# Patient Record
Sex: MALE | Race: WHITE | Employment: UNEMPLOYED | URBAN - METROPOLITAN AREA
[De-identification: names, ages, dates, MRNs, and addresses within clinical notes are randomized per-mention and may not be internally consistent; named-entity substitution may affect disease eponyms.]

---

## 2017-07-20 ENCOUNTER — HOSPITAL ENCOUNTER (OUTPATIENT)
Dept: GENERAL RADIOLOGY | Age: 62
Discharge: HOME | End: 2017-07-20
Attending: FAMILY MEDICINE | Admitting: FAMILY MEDICINE

## 2017-07-20 DIAGNOSIS — M79.641 RIGHT HAND PAIN: ICD-10-CM

## 2019-02-07 ENCOUNTER — HOSPITAL ENCOUNTER (OUTPATIENT)
Dept: GENERAL RADIOLOGY | Age: 64
Discharge: HOME | End: 2019-02-07
Payer: COMMERCIAL

## 2019-02-07 ENCOUNTER — HOSPITAL ENCOUNTER (OUTPATIENT)
Age: 64
Discharge: HOME | End: 2019-02-07
Payer: COMMERCIAL

## 2019-02-07 DIAGNOSIS — J20.9 ACUTE BRONCHITIS, UNSPECIFIED ORGANISM: ICD-10-CM

## 2019-02-07 PROCEDURE — 71046 X-RAY EXAM CHEST 2 VIEWS: CPT

## 2019-09-29 RX ORDER — OLMESARTAN MEDOXOMIL 20 MG/1
40 TABLET ORAL DAILY
COMMUNITY

## 2019-09-29 RX ORDER — PANTOPRAZOLE SODIUM 40 MG/1
40 TABLET, DELAYED RELEASE ORAL DAILY
COMMUNITY
End: 2020-11-30

## 2020-01-09 ENCOUNTER — OFFICE VISIT (OUTPATIENT)
Dept: FAMILY MEDICINE CLINIC | Age: 65
End: 2020-01-09
Payer: COMMERCIAL

## 2020-01-09 ENCOUNTER — TELEPHONE (OUTPATIENT)
Dept: FAMILY MEDICINE CLINIC | Age: 65
End: 2020-01-09

## 2020-01-09 VITALS
HEIGHT: 69 IN | TEMPERATURE: 98.1 F | DIASTOLIC BLOOD PRESSURE: 80 MMHG | OXYGEN SATURATION: 98 % | SYSTOLIC BLOOD PRESSURE: 132 MMHG | BODY MASS INDEX: 32.61 KG/M2 | HEART RATE: 77 BPM | WEIGHT: 220.2 LBS

## 2020-01-09 DIAGNOSIS — J40 BRONCHITIS: ICD-10-CM

## 2020-01-09 LAB
A/G RATIO: 1.5 (ref 1.1–2.2)
ALBUMIN SERPL-MCNC: 4 G/DL (ref 3.4–5)
ALP BLD-CCNC: 62 U/L (ref 40–129)
ALT SERPL-CCNC: 15 U/L (ref 10–40)
ANION GAP SERPL CALCULATED.3IONS-SCNC: 15 MMOL/L (ref 3–16)
AST SERPL-CCNC: 16 U/L (ref 15–37)
BASOPHILS ABSOLUTE: 0.1 K/UL (ref 0–0.2)
BASOPHILS RELATIVE PERCENT: 0.6 %
BILIRUB SERPL-MCNC: 0.4 MG/DL (ref 0–1)
BUN BLDV-MCNC: 10 MG/DL (ref 7–20)
CALCIUM SERPL-MCNC: 9.2 MG/DL (ref 8.3–10.6)
CHLORIDE BLD-SCNC: 101 MMOL/L (ref 99–110)
CO2: 24 MMOL/L (ref 21–32)
CREAT SERPL-MCNC: 1.5 MG/DL (ref 0.8–1.3)
EOSINOPHILS ABSOLUTE: 0.3 K/UL (ref 0–0.6)
EOSINOPHILS RELATIVE PERCENT: 3.5 %
GFR AFRICAN AMERICAN: 57
GFR NON-AFRICAN AMERICAN: 47
GLOBULIN: 2.7 G/DL
GLUCOSE BLD-MCNC: 111 MG/DL (ref 70–99)
HCT VFR BLD CALC: 45.7 % (ref 40.5–52.5)
HEMOGLOBIN: 15.3 G/DL (ref 13.5–17.5)
LYMPHOCYTES ABSOLUTE: 2 K/UL (ref 1–5.1)
LYMPHOCYTES RELATIVE PERCENT: 21.2 %
MCH RBC QN AUTO: 31 PG (ref 26–34)
MCHC RBC AUTO-ENTMCNC: 33.5 G/DL (ref 31–36)
MCV RBC AUTO: 92.6 FL (ref 80–100)
MONOCYTES ABSOLUTE: 1 K/UL (ref 0–1.3)
MONOCYTES RELATIVE PERCENT: 11 %
NEUTROPHILS ABSOLUTE: 5.9 K/UL (ref 1.7–7.7)
NEUTROPHILS RELATIVE PERCENT: 63.7 %
PDW BLD-RTO: 13.8 % (ref 12.4–15.4)
PLATELET # BLD: 264 K/UL (ref 135–450)
PMV BLD AUTO: 8.5 FL (ref 5–10.5)
POTASSIUM SERPL-SCNC: 4.4 MMOL/L (ref 3.5–5.1)
RBC # BLD: 4.93 M/UL (ref 4.2–5.9)
SODIUM BLD-SCNC: 140 MMOL/L (ref 136–145)
TOTAL PROTEIN: 6.7 G/DL (ref 6.4–8.2)
WBC # BLD: 9.3 K/UL (ref 4–11)

## 2020-01-09 PROCEDURE — 99214 OFFICE O/P EST MOD 30 MIN: CPT | Performed by: FAMILY MEDICINE

## 2020-01-09 RX ORDER — BENZONATATE 100 MG/1
100 CAPSULE ORAL 3 TIMES DAILY PRN
Qty: 30 CAPSULE | Refills: 0 | Status: SHIPPED | OUTPATIENT
Start: 2020-01-09 | End: 2020-01-16

## 2020-01-09 RX ORDER — GUAIFENESIN 600 MG/1
600 TABLET, EXTENDED RELEASE ORAL 2 TIMES DAILY
Qty: 30 TABLET | Refills: 0 | Status: SHIPPED | OUTPATIENT
Start: 2020-01-09 | End: 2020-01-09

## 2020-01-09 RX ORDER — AMOXICILLIN AND CLAVULANATE POTASSIUM 875; 125 MG/1; MG/1
1 TABLET, FILM COATED ORAL 2 TIMES DAILY
Qty: 20 TABLET | Refills: 0 | Status: SHIPPED | OUTPATIENT
Start: 2020-01-09 | End: 2020-01-19

## 2020-01-09 RX ORDER — METHYLPREDNISOLONE 4 MG/1
TABLET ORAL
Qty: 1 KIT | Refills: 0 | Status: SHIPPED | OUTPATIENT
Start: 2020-01-09 | End: 2020-01-15

## 2020-01-09 RX ORDER — METHYLPREDNISOLONE 4 MG/1
TABLET ORAL
Qty: 1 KIT | Refills: 0 | Status: SHIPPED | OUTPATIENT
Start: 2020-01-09 | End: 2020-01-09

## 2020-01-09 RX ORDER — AMOXICILLIN AND CLAVULANATE POTASSIUM 875; 125 MG/1; MG/1
1 TABLET, FILM COATED ORAL 2 TIMES DAILY
Qty: 20 TABLET | Refills: 0 | Status: SHIPPED | OUTPATIENT
Start: 2020-01-09 | End: 2020-01-09

## 2020-01-09 RX ORDER — BENZONATATE 100 MG/1
100 CAPSULE ORAL 3 TIMES DAILY PRN
Qty: 30 CAPSULE | Refills: 0 | Status: SHIPPED | OUTPATIENT
Start: 2020-01-09 | End: 2020-01-09

## 2020-01-09 RX ORDER — GUAIFENESIN 600 MG/1
600 TABLET, EXTENDED RELEASE ORAL 2 TIMES DAILY
Qty: 30 TABLET | Refills: 0 | Status: SHIPPED | OUTPATIENT
Start: 2020-01-09 | End: 2020-01-24

## 2020-01-09 ASSESSMENT — PATIENT HEALTH QUESTIONNAIRE - PHQ9
SUM OF ALL RESPONSES TO PHQ9 QUESTIONS 1 & 2: 0
SUM OF ALL RESPONSES TO PHQ QUESTIONS 1-9: 0
2. FEELING DOWN, DEPRESSED OR HOPELESS: 0
1. LITTLE INTEREST OR PLEASURE IN DOING THINGS: 0
SUM OF ALL RESPONSES TO PHQ QUESTIONS 1-9: 0

## 2020-01-09 ASSESSMENT — ENCOUNTER SYMPTOMS
SORE THROAT: 1
WHEEZING: 1
NAUSEA: 0
ABDOMINAL PAIN: 0
VOMITING: 0
COUGH: 1
SINUS PRESSURE: 1
DIARRHEA: 0
SINUS PAIN: 1
SHORTNESS OF BREATH: 0
RHINORRHEA: 1
BACK PAIN: 1

## 2020-01-09 NOTE — PATIENT INSTRUCTIONS
Patient Education        Patient Education        guaifenesin  Pronunciation:  gwye FEN e sin  Brand: Altarussin, Bidex-400, Fenesin IR, Corie-Tussin Expectorant, Mucinex, Mucus Relief, Robafen, Scot-Tussin, Siltussin SA, Tussin Expectorant, Xpect  What is the most important information I should know about guaifenesin? Ask a doctor or pharmacist before using this medicine if you have health problems or use other medications, or if you are pregnant or breast-feeding. What is guaifenesin? Guaifenesin is used to reduce chest congestion caused by the common cold, flu, or chronic bronchitis. Guaifenesin helps loosen congestion in your chest and throat, making it easier to cough out through your mouth. There are many brands and forms of guaifenesin available. Not all brands are listed on this leaflet. Guaifenesin may also be used for purposes not listed in this medication guide. What should I discuss with my healthcare provider before taking guaifenesin? You should not use guaifenesin if you are allergic to it. Ask a doctor or pharmacist if it is safe for you to use this medicine if you have other medical conditions. Ask a doctor before using this medicine if you are pregnant. You should not breast-feed while using guaifenesin. How should I take guaifenesin? Use exactly as directed on the label, or as prescribed by your doctor. Cold or cough medicine is only for short-term use until your symptoms clear up. Always follow directions on the medicine label about giving cough or cold medicine to a child. Do not use the medicine only to make a child sleepy. Death can occur from the misuse of cough or cold medicines in very young children. Measure liquid medicine carefully. Use the dosing syringe provided, or use a medicine dose-measuring device (not a kitchen spoon). To use guaifenesin granules, pour out the entire packet onto your tongue and swallow without chewing.   Call your doctor if your symptoms do not improve after 7 days, or if you have a fever, rash, or headaches. This medicine can affect the results of certain medical tests. Tell any doctor who treats you that you are using guaifenesin. Store at room temperature away from moisture and heat. Do not freeze. What happens if I miss a dose? Since cough or cold medicine is used when needed, you may not be on a dosing schedule. Skip any missed dose if it's almost time for your next dose. Do not use two doses at one time. What happens if I overdose? Seek emergency medical attention or call the Poison Help line at 1-247.849.2739. What should I avoid while taking guaifenesin? Ask a doctor or pharmacist before using other cough or cold medicines that may contain similar ingredients. Avoid driving or hazardous activity until you know how this medicine will affect you. Your reactions could be impaired. What are the possible side effects of guaifenesin? Get emergency medical help if you have signs of an allergic reaction: hives; difficult breathing; swelling of your face, lips, tongue, or throat. Common side effects may include:  · nausea; or  · vomiting. This is not a complete list of side effects and others may occur. Call your doctor for medical advice about side effects. You may report side effects to FDA at 0-920-YET-2497. What other drugs will affect guaifenesin ? Avoid using this medicine with other drugs that cause drowsiness or slow your breathing (such as opioid medicine, a muscle relaxer, or medicine for anxiety or seizures). Ask a doctor or pharmacist before using any other medication, including prescription and over-the-counter medicines, vitamins, and herbal products. Not all possible drug interactions are listed in this medication guide. Where can I get more information? Your pharmacist can provide more information about guaifenesin.   Remember, keep this and all other medicines out of the reach of children, never share your medicines with (oral)  Pronunciation:  METH il pred NIS oh lone  Brand:  Medrol, Medrol Dosepak, MethylPREDNISolone Dose Pack  What is the most important information I should know about methylprednisolone? You should not use this medicine if you have a fungal infection anywhere in your body. What is methylprednisolone? Methylprednisolone is a steroid that prevents the release of substances in the body that cause inflammation. Methylprednisolone is used to treat many different inflammatory conditions such as arthritis, lupus, psoriasis, ulcerative colitis, allergic disorders, gland (endocrine) disorders, and conditions that affect the skin, eyes, lungs, stomach, nervous system, or blood cells. Methylprednisolone may also be used for purposes not listed in this medication guide. What should I discuss with my healthcare provider before taking methylprednisolone? You should not use methylprednisolone if you are allergic to it, or if you have:  · a fungal infection anywhere in your body. Methylprednisolone can weaken your immune system, making it easier for you to get an infection. Steroids can also worsen an infection you already have, or reactivate an infection you recently had. Tell your doctor about any illness or infection you have had within the past several weeks. To make sure methylprednisolone is safe for you, tell your doctor if you have ever had:  · a thyroid disorder;  · herpes infection of the eyes;  · stomach ulcers, ulcerative colitis, or diverticulitis;  · depression, mental illness, or psychosis;  · liver disease (especially cirrhosis);  · high blood pressure;  · osteoporosis;  · a muscle disorder such as myasthenia gravis; or  · multiple sclerosis. Also tell your doctor if you have diabetes. Steroid medicines may increase the glucose (sugar) levels in your blood or urine. You may also need to adjust the dose of your diabetes medications. It is not known whether this medicine will harm an unborn baby.  Tell your doctor if you are pregnant or plan to become pregnant. It is not known whether methylprednisolone passes into breast milk or if it could affect the nursing baby. Tell your doctor if you are breast-feeding. How should I take methylprednisolone? Follow all directions on your prescription label. Your doctor may occasionally change your dose. Do not use this medicine in larger or smaller amounts or for longer than recommended. Methylprednisolone is sometimes taken every other day. Follow your doctor's dosing instructions very carefully. Your dose needs may change if you have unusual stress such as a serious illness, fever or infection, or if you have surgery or a medical emergency. Tell your doctor about any such situation that affects you. This medicine can cause unusual results with certain medical tests. Tell any doctor who treats you that you are using methylprednisolone. You should not stop using methylprednisolone suddenly. Follow your doctor's instructions about tapering your dose. Wear a medical alert tag or carry an ID card stating that you take methylprednisolone. Any medical care provider who treats you should know that you take steroid medication. If you need surgery, tell the surgeon ahead of time that you are using methylprednisolone. You may need to stop using the medicine for a short time. Store at room temperature away from moisture and heat. What happens if I miss a dose? Call your doctor for instructions if you miss a dose of methylprednisolone. What happens if I overdose? Seek emergency medical attention or call the Poison Help line at 1-510.310.6261. An overdose of methylprednisolone is not expected to produce life threatening symptoms.  However, long term use of high steroid doses can lead to symptoms such as thinning skin, easy bruising, changes in the shape or location of body fat (especially in your face, neck, back, and waist), increased acne or facial hair, menstrual problems, impotence, or loss of interest in sex. What should I avoid while taking methylprednisolone? Avoid being near people who are sick or have infections. Call your doctor for preventive treatment if you are exposed to chicken pox or measles. These conditions can be serious or even fatal in people who are using steroid medication. Do not receive a \"live\" vaccine while using methylprednisolone. The vaccine may not work as well during this time, and may not fully protect you from disease. Live vaccines include measles, mumps, rubella (MMR), polio, rotavirus, typhoid, yellow fever, varicella (chickenpox), zoster (shingles), and nasal flu (influenza) vaccine. What are the possible side effects of methylprednisolone? Get emergency medical help if you have signs of an allergic reaction: hives; difficult breathing; swelling of your face, lips, tongue, or throat. Call your doctor at once if you have:  · shortness of breath (even with mild exertion), swelling, rapid weight gain;  · bruising, thinning skin, or any wound that will not heal;  · blurred vision, tunnel vision, eye pain, or seeing halos around lights;  · severe depression, changes in personality, unusual thoughts or behavior;  · new or unusual pain in an arm or leg or in your back;  · bloody or tarry stools, coughing up blood or vomit that looks like coffee grounds;  · seizure (convulsions); or  · low potassium --leg cramps, constipation, irregular heartbeats, fluttering in your chest, increased thirst or urination, numbness or tingling. Steroids can affect growth in children. Tell your doctor if your child is not growing at a normal rate while using this medicine. Common side effects may include:  · fluid retention (swelling in your hands or ankles);  · dizziness, spinning sensation;  · changes in your menstrual periods;  · headache;  · mild muscle pain or weakness; or  · stomach discomfort, bloating.   This is not a complete list of side effects and others may occur. Call your doctor for medical advice about side effects. You may report side effects to FDA at 3-412-FDA-1296. What other drugs will affect methylprednisolone? Other drugs may interact with methylprednisolone, including prescription and over-the-counter medicines, vitamins, and herbal products. Tell each of your health care providers about all medicines you use now and any medicine you start or stop using. Where can I get more information? Your pharmacist can provide more information about methylprednisolone. Remember, keep this and all other medicines out of the reach of children, never share your medicines with others, and use this medication only for the indication prescribed. Every effort has been made to ensure that the information provided by [address] is accurate, up-to-date, and complete, but no guarantee is made to that effect. Drug information contained herein may be time sensitive. [email or web address] information has been compiled for use by healthcare practitioners and consumers in the [place] and therefore [employer] does not warrant that uses outside of the [place] are appropriate, unless specifically indicated otherwise. [facility]'s drug information does not endorse drugs, diagnose patients or recommend therapy. [employer] drug information is an informational resource designed to assist licensed healthcare practitioners in caring for their patients and/or to serve consumers viewing this service as a supplement to, and not a substitute for, the expertise, skill, knowledge and judgment of healthcare practitioners. The absence of a warning for a given drug or drug combination in no way should be construed to indicate that the drug or drug combination is safe, effective or appropriate for any given patient. [facility] does not assume any responsibility for any aspect of healthcare administered with the aid of information [facility] provides.  The information contained herein is not intended to cover all possible uses, directions, precautions, warnings, drug interactions, allergic reactions, or adverse effects. If you have questions about the drugs you are taking, check with your doctor, nurse or pharmacist.  Copyright 5057-4331 [address]. Version: 9.01. Revision date: 8/30/2017. Care instructions adapted under license by [facility] ([facility]). If you have questions about a medical condition or this instruction, always ask your healthcare professional. [place] [address] any warranty or liability for your use of this information. Patient Education        amoxicillin and clavulanate potassium  Pronunciation:  am OK i REJI in [address] ate lisette TAS ee um  Brand:  Augmentin, Augmentin ES-600, Augmentin XR  What is the most important information I should know about amoxicillin and clavulanate potassium? You should not use this medicine if you have severe kidney disease, if you have had liver problems or jaundice while taking amoxicillin and clavulanate potassium, or if you are allergic to any penicillin or cephalosporin antibiotic, such as Amoxil, Ceftin, Cefzil, Moxatag, Omnicef, and others. What is amoxicillin and clavulanate potassium? Amoxicillin is a penicillin antibiotic that fights bacteria in the body. Clavulanate potassium is a beta-lactamase inhibitor that helps prevent certain bacteria from becoming resistant to amoxicillin. Amoxicillin and clavulanate potassium is a combination medicine used to treat many different infections caused by bacteria, such as sinusitis, pneumonia, ear infections, bronchitis, urinary tract infections, and infections of the skin. Amoxicillin and clavulanate potassium may also be used for purposes not listed in this medication guide. What should I discuss with my healthcare provider before taking amoxicillin and clavulanate potassium?   You should not use this medicine if you are allergic to it, or if:  · you have severe kidney disease (or if you are on dialysis);  · you have had liver problems or jaundice while taking amoxicillin and clavulanate potassium; or  · you are allergic to any penicillin or cephalosporin antibiotic, such as Amoxil, Ceftin, Cefzil, Moxatag, Omnicef, and others. To make sure amoxicillin and clavulanate potassium is safe for you, tell your doctor if you have ever had:  · liver disease (hepatitis or jaundice);  · kidney disease; or  · mononucleosis. It is not known whether this medicine will harm an unborn baby. Tell your doctor if you are pregnant or plan to become pregnant. Amoxicillin and clavulanate potassium can make birth control pills less effective. Ask your doctor about using a non-hormonal birth control (condom, diaphragm with spermicide) to prevent pregnancy. Amoxicillin and clavulanate potassium can pass into breast milk and may affect the nursing baby. Tell your doctor if you are breast-feeding. Do not give this medicine to a child without medical advice. The liquid or chewable tablet may contain phenylalanine. Talk to your doctor before using these forms of this medicine if you have phenylketonuria (PKU). How should I take amoxicillin and clavulanate potassium? Follow all directions on your prescription label. Do not take this medicine in larger or smaller amounts or for longer than recommended. Take the medicine every 12 hours, at the start of a meal to reduce stomach upset. Do not crush or chew the extended-release tablet. Swallow the pill whole, or break the pill in half and take both halves one at a time. If you have trouble swallowing a whole or half pill, talk with your doctor about using another form of amoxicillin and clavulanate potassium. The chewable tablet must be chewed before you swallow it. Shake the liquid medicine well just before you measure a dose. Measure liquid medicine with the dosing syringe provided, or with a special dose-measuring spoon or medicine cup.  If you do not have a dose-measuring device, ask your pharmacist for one. Use this medicine for the full prescribed length of time. Your symptoms may improve before the infection is completely cleared. Skipping doses may also increase your risk of further infection that is resistant to antibiotics. Amoxicillin and clavulanate potassium will not treat a viral infection such as the flu or a common cold. This medicine can cause unusual results with certain lab tests for glucose (sugar) in the urine. Tell any doctor who treats you that you are using amoxicillin and clavulanate potassium. Store the tablets at room temperature away from moisture and heat. Store the liquid  in the refrigerator. Throw away any unused liquid after 10 days. What happens if I miss a dose? Take the missed dose as soon as you remember. Skip the missed dose if it is almost time for your next scheduled dose. Do not take extra medicine to make up the missed dose. What happens if I overdose? Seek emergency medical attention or call the Poison Help line at 1-428.820.5217. Overdose can cause nausea, vomiting, stomach pain, diarrhea, skin rash, drowsiness, hyperactivity, and decreased urination. What should I avoid while taking amoxicillin and clavulanate potassium? Avoid taking this medicine together with or just after eating a high-fat meal. This will make it harder for your body to absorb the medication. Antibiotic medicines can cause diarrhea, which may be a sign of a new infection. If you have diarrhea that is watery or bloody, call your doctor. Do not use anti-diarrhea medicine unless your doctor tells you to. What are the possible side effects of amoxicillin and clavulanate potassium? Get emergency medical help if you have signs of an allergic reaction: hives; difficult breathing; swelling of your face, lips, tongue, or throat.   Call your doctor at once if you have:  · severe stomach pain, diarrhea that is watery or bloody;  · pale or appropriate, unless specifically indicated otherwise. [facility] drug information does not endorse drugs, diagnose patients or recommend therapy. [facility]'s drug information is an informational resource designed to assist licensed healthcare practitioners in caring for their patients and/or to serve consumers viewing this service as a supplement to, and not a substitute for, the expertise, skill, knowledge and judgment of healthcare practitioners. The absence of a warning for a given drug or drug combination in no way should be construed to indicate that the drug or drug combination is safe, effective or appropriate for any given patient. [facility] does not assume any responsibility for any aspect of healthcare administered with the aid of information [facility] provides. The information contained herein is not intended to cover all possible uses, directions, precautions, warnings, drug interactions, allergic reactions, or adverse effects. If you have questions about the drugs you are taking, check with your doctor, nurse or pharmacist.  Copyright 5615-5914 [address]. Version: 11.02. Revision date: 1/2/2018. Care instructions adapted under license by [facility] ([facility]). If you have questions about a medical condition or this instruction, always ask your healthcare professional. [place] [address] any warranty or liability for your use of this information. Patient Education        benzonatate  Pronunciation:  jalyn baires  Brand:  Tessalon, Tessalon Perles, Zonatuss  What is the most important information I should know about benzonatate? You should not use this medication if you are allergic to benzonatate or topical numbing medicines such as tetracaine or procaine (found in some insect bite and sunburn creams). Never suck or chew on a benzonatate capsule. Swallow the pill whole.  Sucking or chewing the capsule may cause your mouth and throat to feel numb or cause other serious side effects. Serious side effects of benzonatate include choking feeling, chest pain or numbness, feeling like you might pass out, confusion, or hallucinations. Some of these side effects may result from chewing or sucking on a benzonatate capsule. Do not give this medication to a child younger than 8years old without medical advice. An overdose of benzonatate can be fatal to a child. What is benzonatate? Benzonatate is a non-narcotic cough medicine. It works by numbing the throat and lungs, making the cough reflex less active. Benzonatate is used to relieve coughing. Benzonatate may also be used for purposes not listed in this medication guide. What should I discuss with my healthcare provider before taking benzonatate? You should not use this medication if you are allergic to benzonatate or topical numbing medicines such as tetracaine or procaine (found in some insect bite and sunburn creams). FDA pregnancy category C. It is not known whether benzonatate will harm an unborn baby. Tell your doctor if you are pregnant or plan to become pregnant while using this medication. It is not known whether benzonatate passes into breast milk or if it could harm a nursing baby. Do not use this medication without telling your doctor if you are breast-feeding a baby. Do not give this medication to a child younger than 8years old without medical advice. An overdose of benzonatate can be fatal, especially to a child. How should I take benzonatate? Take exactly as prescribed by your doctor. Do not take in larger or smaller amounts or for longer than recommended. Follow the directions on your prescription label. Always ask a doctor before giving a cough medicine to a child. Death can occur from the misuse of cough and cold medicines in very young children. Take each dose with a full glass of water. Never suck or chew on a benzonatate capsule. Swallow the pill whole.  Sucking or chewing the capsule may cause your mouth and throat to feel numb or cause other serious side effects. Store at room temperature away from moisture, heat, and light. What happens if I miss a dose? Take the missed dose as soon as you remember. Skip the missed dose if it is almost time for your next scheduled dose. Do not  take extra medicine to make up the missed dose. What happens if I overdose? Seek emergency medical attention or call the Poison Help line at 1-293.507.4356. An overdose of benzonatate can be fatal, especially to a child. Accidental death has occurred in children under 3years old who took only 1 or 2 capsules. Overdose symptoms may include numbness in the mouth or throat, feeling restless or very sleepy, tremors or shaking, seizure (convulsions), slow heart rate, weak pulse, fainting, and slow breathing (breathing may stop). What should I avoid while taking benzonatate? Follow your doctor's instructions about any restrictions on food, beverages, or activity while you are using benzonatate  What are the possible side effects of benzonatate? Get emergency medical help if you have any of these signs of an allergic reaction:  hives; difficulty breathing; swelling of your face, lips, tongue, or throat. Stop taking benzonatate and call your doctor at once if you have a serious side effect such as:  · a choking feeling;  · chest pain or numbness;  · feeling like you might pass out;  · confusion; or  · hallucinations. Some of these side effects may result from chewing or sucking on a benzonatate capsule. Less serious side effects may include:  · headache;  · dizziness;  · drowsiness;  · nausea, vomiting, constipation; or  · mild itching or skin rash. This is not a complete list of side effects and others may occur. Call your doctor for medical advice about side effects. You may report side effects to FDA at 2-981-FWB-0200. What other drugs will affect benzonatate?   Before taking benzonatate, tell your doctor if you regularly use other medicines that make you sleepy (such as cold or allergy medicine, sedatives, narcotic pain medicine, sleeping pills, muscle relaxers, and medicine for seizures, depression, or anxiety). They can add to drowsiness and other side effects of benzonatate. There may be other drugs that can interact with benzonatate. Tell your doctor about all medications you use. This includes prescription, over-the-counter, vitamin, and herbal products. Do not start a new medication without telling your doctor. Where can I get more information? Your pharmacist can provide more information about benzonatate. Remember, keep this and all other medicines out of the reach of children, never share your medicines with others, and use this medication only for the indication prescribed. Every effort has been made to ensure that the information provided by [address] is accurate, up-to-date, and complete, but no guarantee is made to that effect. Drug information contained herein may be time sensitive. [facility] information has been compiled for use by healthcare practitioners and consumers in the [place] and therefore [facility] does not warrant that uses outside of the [place] are appropriate, unless specifically indicated otherwise. [facility]'s drug information does not endorse drugs, diagnose patients or recommend therapy. [facility] drug information is an informational resource designed to assist licensed healthcare practitioners in caring for their patients and/or to serve consumers viewing this service as a supplement to, and not a substitute for, the expertise, skill, knowledge and judgment of healthcare practitioners. The absence of a warning for a given drug or drug combination in no way should be construed to indicate that the drug or drug combination is safe, effective or appropriate for any given patient.  [facility] does not assume any responsibility for any aspect of healthcare administered with the aid of information Octavio provides. The information contained herein is not intended to cover all possible uses, directions, precautions, warnings, drug interactions, allergic reactions, or adverse effects. If you have questions about the drugs you are taking, check with your doctor, nurse or pharmacist.  Copyright 7171-7740 [address]. Version: 8.01. Revision date: 3/9/2011. Care instructions adapted under license by [facility] [address] If you have questions about a medical condition or this instruction, always ask your healthcare professional. [place] [address] any warranty or liability for your use of this information.

## 2020-01-09 NOTE — PROGRESS NOTES
(Intermittently with night sweats). Negative for chills, fatigue, fever (no weight loss) and unexpected weight change. HENT: Positive for congestion, ear pain (L ear pain intermittently. Denies pressure bilaterally), rhinorrhea (2-3 weeks ago and has gotten better), sinus pressure, sinus pain, sneezing and sore throat. Negative for postnasal drip. Eyes: Negative for visual disturbance (no blurred or double vision. ). Respiratory: Positive for cough (productive with yellow sputum) and wheezing. Negative for shortness of breath. Cardiovascular: Negative for chest pain, palpitations and leg swelling. Gastrointestinal: Negative for abdominal pain, diarrhea, nausea and vomiting. Musculoskeletal: Positive for back pain (thoracic back pain - dull pain). Skin: Negative for rash. Neurological: Negative for dizziness, light-headedness and headaches.        PAST MEDICAL HISTORY  Past Medical History:   Diagnosis Date    Arthritis of knee 2002    Borderline diabetic 2002    Cancer [facility])     2002/colon    Chronic kidney disease (CKD) stage G3a/A1, moderately decreased glomerular filtration rate (GFR) between 45-59 mL/min/1.73 square meter and albuminuria creatinine ratio less than 30 mg/g ([facility] 12/19/2016    Eales disease 1989    Hyperlipidemia 12/19/2016    Hypertension        FAMILY HISTORY  Family History   Problem Relation Age of Onset    Heart Disease Father     High Blood Pressure Father     Pacemaker Father     Diabetes Maternal Grandmother     Hypertension Mother     Hypertension Sister     Diabetes Maternal Grandfather     Asthma Maternal Grandfather     Heart Attack Paternal Grandfather 52       SOCIAL HISTORY  Social History     Socioeconomic History    Marital status:      Spouse name: None    Number of children: None    Years of education: None    Highest education level: None   Occupational History    Occupation: retired   Social Needs    Financial resource strain: None Malignant neoplasm of colon, unspecified part of colon [facility])  Patient had been following a specialist in [place], but was interested in seeing Dr. Virginia Pierson again. Referral will be provided if necessary, but the patient was going to call their office to see if Dr. Virginia Pierson would be willing to see him. 3. Bronchitis  Patient will have a chest x-ray completed to rule out any pneumonia. Also will have lab work completed as outlined below, and patient will be updated on the results once completed. Will be prescribed Augmentin as outlined below. Patient was cautioned against possible side effects including nausea, vomiting, diarrhea, and C. difficile infection. Patient was instructed to call us should they develop foul swelling or watery stools and we will test and treat for C. difficile if necessary. Patient wanted to continue treatment. Will also be prescribed Tessalon Perles as well as Mucinex and Medrol Dosepak as outlined below. Did inform patient that the Medrol Dosepak can cause some palpitations, insomnia, flushing. Patient wanted to continue treatment. Information was included in the patient's AVS about these medications. He is to call with any questions or concerns or issues. - XR CHEST STANDARD (2 VW); Future  - CBC Auto Differential; Future  - Comprehensive Metabolic Panel; Future  - amoxicillin-clavulanate (AUGMENTIN) 875-125 MG per tablet; Take 1 tablet by mouth 2 times daily for 10 days  Dispense: 20 tablet; Refill: 0  - benzonatate (TESSALON) 100 MG capsule; Take 1 capsule by mouth 3 times daily as needed for Cough  Dispense: 30 capsule; Refill: 0  - guaiFENesin (MUCINEX) 600 MG extended release tablet; Take 1 tablet by mouth 2 times daily for 15 days  Dispense: 30 tablet; Refill: 0  - methylPREDNISolone (MEDROL DOSEPACK) 4 MG tablet; Take by mouth. Dispense: 1 kit; Refill: 0    4.  Acute suppurative otitis media of both ears without spontaneous rupture of tympanic membranes, recurrence not specified  Patient will be prescribed Augmentin as outlined below for his otitis media of bilateral ears. Patient was cautioned against possible side effects including nausea, vomiting, diarrhea, and C. difficile infection. Patient was instructed to call us should they develop foul swelling or watery stools and we will test and treat for C. difficile if necessary. Patient wanted to continue treatment. - amoxicillin-clavulanate (AUGMENTIN) 875-125 MG per tablet; Take 1 tablet by mouth 2 times daily for 10 days  Dispense: 20 tablet; Refill: 0    Patient is to follow-up as needed, unless he does not get better from these medications. Pt is to call with any questions, concerns, or increase in symptoms. Pt verbalized understanding of and agreement with current plan of care. Electronically signed by YOVANA Palumbo on 1/9/2020      Please note that this chart was generated using dragon dictation software. Although every effort was made to ensure the accuracy of this automated transcription, some errors in transcription may have occurred.

## 2020-01-10 ENCOUNTER — TELEPHONE (OUTPATIENT)
Dept: FAMILY MEDICINE CLINIC | Age: 65
End: 2020-01-10

## 2020-01-10 NOTE — TELEPHONE ENCOUNTER
Spoke with patient at 306pm regard. Message below per Mary YEN patient voiced understanding. Electronically signed by Pinkie Riedel, LPN on 0/61/4562 at 1:65 PM    ----- Message from Rogelio Ward sent at 1/10/2020  2:34 PM EST -----  His kidney function is slightly decreased and his creatinine is elevated. I need him to avoid any NSAIDs including ibuprofen, Motrin, Advil, Aleve, Mobic, or naproxen or Naprosyn. Rink plenty of water. I would like to recheck this lab in about 1 to 2 weeks. His blood sugar was elevated, but I do not believe that he was fasting, so we will just have him watch his sugar intake. CBC is WNL.     Thanks, Darya

## 2020-01-15 ENCOUNTER — HOSPITAL ENCOUNTER (OUTPATIENT)
Dept: GENERAL RADIOLOGY | Age: 65
Discharge: HOME | End: 2020-01-15
Payer: COMMERCIAL

## 2020-01-15 ENCOUNTER — HOSPITAL ENCOUNTER (OUTPATIENT)
Age: 65
Discharge: HOME | End: 2020-01-15
Payer: COMMERCIAL

## 2020-01-15 PROCEDURE — 71046 X-RAY EXAM CHEST 2 VIEWS: CPT

## 2020-01-16 ENCOUNTER — TELEPHONE (OUTPATIENT)
Dept: FAMILY MEDICINE CLINIC | Age: 65
End: 2020-01-16

## 2020-01-17 RX ORDER — BENZONATATE 100 MG/1
100 CAPSULE ORAL 3 TIMES DAILY PRN
Qty: 30 CAPSULE | Refills: 0 | Status: SHIPPED | OUTPATIENT
Start: 2020-01-17 | End: 2020-01-24

## 2020-01-17 NOTE — TELEPHONE ENCOUNTER
Spoke with patient at 957am regard. Message below per [facility] MALIHA YEN , patient states he is currently taking mucinex.   Electronically signed by Sissy Rice LPN on 3/44/8194 at 2:81 AM

## 2020-01-20 ENCOUNTER — TELEPHONE (OUTPATIENT)
Dept: FAMILY MEDICINE CLINIC | Age: 65
End: 2020-01-20

## 2020-01-20 RX ORDER — AMOXICILLIN AND CLAVULANATE POTASSIUM 875; 125 MG/1; MG/1
1 TABLET, FILM COATED ORAL 2 TIMES DAILY
Qty: 14 TABLET | Refills: 0 | Status: SHIPPED | OUTPATIENT
Start: 2020-01-20 | End: 2020-01-27

## 2020-01-20 RX ORDER — AZITHROMYCIN 250 MG/1
250 TABLET, FILM COATED ORAL SEE ADMIN INSTRUCTIONS
Qty: 6 TABLET | Refills: 0 | Status: SHIPPED | OUTPATIENT
Start: 2020-01-20 | End: 2020-01-25

## 2020-01-20 NOTE — TELEPHONE ENCOUNTER
Lm regard. Message below per Ramone YEN patient is to call if he has any further questions or concerns.   Electronically signed by Maykel Gonzalez LPN on 4/68/1446 at 80:31 AM

## 2020-01-20 NOTE — TELEPHONE ENCOUNTER
Finished antibiotics on Saturday but thinks he needs another round. Not feeling any better.  Can you call this in please??    [facility] [address]

## 2020-02-05 ENCOUNTER — ANESTHESIA EVENT (OUTPATIENT)
Dept: OPERATING ROOM | Age: 65
End: 2020-02-05
Payer: COMMERCIAL

## 2020-02-05 NOTE — ANESTHESIA PRE PROCEDURE
Products      Egg yolks     Garlic     Lac Bovis     Onion     Soybean-Containing Drug Products     Tomato     Wheat Bran        Problem List:    Patient Active Problem List   Diagnosis Code    Testicular/scrotal pain [id number]    Chronic kidney disease (CKD) stage G3a/A1, moderately decreased glomerular filtration rate (GFR) between 45-59 mL/min/1.73 square meter and albuminuria creatinine ratio less than 30 mg/g (HCC) N18.3    HTN (hypertension) I10    Colon cancer (HCC) C18.9    Hyperlipidemia E78.5       Past Medical History:        Diagnosis Date    Arthritis of knee 2002    Borderline diabetic 2002    Cancer [facility])     2002/colon    Chronic kidney disease (CKD) stage G3a/A1, moderately decreased glomerular filtration rate (GFR) between 45-59 mL/min/1.73 square meter and albuminuria creatinine ratio less than 30 mg/g ([facility] 12/19/2016    Eales disease 1989    Hyperlipidemia 12/19/2016    Hypertension        Past Surgical History:        Procedure Laterality Date    APPENDECTOMY      CHOLECYSTECTOMY      COLECTOMY      COLONOSCOPY  6/5/14    sm int hem, r hemicolectomy    COLONOSCOPY  10[place]18/2016    [place] [facility]    ENDOSCOPY, COLON, DIAGNOSTIC  6/5/14    sm ulcer a ileocecal anastomosis, H H, erosive esophagitis       Social History:    Social History     Tobacco Use    Smoking status: Light Tobacco Smoker     Packs/day: 0.25     Types: Cigars    Smokeless tobacco: Never Used    Tobacco comment: socially not very often the pt states    Substance Use Topics    Alcohol use: Yes     Comment: socially                                Ready to quit: Not Answered  Counseling given: Not Answered  Comment: socially not very often the pt states       Vital Signs (Current): There were no vitals filed for this visit.                                            BP Readings from Last 3 Encounters:   01/09/20 132/80   12/19/16 (!) 160/95   03/25/15 132/82       NPO Status: BMI:   Wt Readings from Last 3 Encounters:   01/09/20 220 lb 3.2 oz (99.9 kg)   12/19/16 213 lb 6.4 oz (96.8 kg)   03/25/15 215 lb (97.5 kg)     There is no height or weight on file to calculate BMI.    CBC:   Lab Results   Component Value Date    WBC 9.3 01/09/2020    RBC 4.93 01/09/2020    HGB 15.3 01/09/2020    HCT 45.7 01/09/2020    MCV 92.6 01/09/2020    RDW 13.8 01/09/2020     01/09/2020       CMP:   Lab Results   Component Value Date     01/09/2020    K 4.4 01/09/2020     01/09/2020    CO2 24 01/09/2020    BUN 10 01/09/2020    CREATININE 1.5 01/09/2020    GFRAA 57 01/09/2020    AGRATIO 1.5 01/09/2020    LABGLOM 47 01/09/2020    GLUCOSE 111 01/09/2020    PROT 6.7 01/09/2020    PROT 7.6 02/21/2011    CALCIUM 9.2 01/09/2020    BILITOT 0.4 01/09/2020    ALKPHOS 62 01/09/2020    AST 16 01/09/2020    ALT 15 01/09/2020       POC Tests: No results for input(s): POCGLU, POCNA, POCK, POCCL, POCBUN, POCHEMO, POCHCT in the last 72 hours. Coags:   Lab Results   Component Value Date    PROTIME 11.2 02/21/2011    INR 1.02 02/21/2011    APTT 22.6 02/21/2011       HCG (If Applicable): No results found for: PREGTESTUR, PREGSERUM, HCG, HCGQUANT     ABGs: No results found for: PHART, PO2ART, ETH4UPD, DWD7XRM, BEART, F9OAOEQF     Type & Screen (If Applicable):  No results found for: SVETA [facility]    Anesthesia Evaluation  Patient summary reviewed  Airway: Mallampati: II     Neck ROM: full  Mouth opening: > = 3 FB Dental:          Pulmonary:normal exam                               Cardiovascular:    (+) hypertension:, hyperlipidemia                  Neuro/Psych:               GI/Hepatic/Renal:   (+) renal disease: CRI,           Endo/Other:    (+) malignancy/cancer. Pt had no PAT visit       Abdominal:           Vascular:                                      Anesthesia Plan      MAC     ASA 3     (Chart review)  Induction: intravenous.       Anesthetic plan and risks discussed with patient. Plan discussed with CRNA.     Attending anesthesiologist reviewed and agrees with Pre Eval content

## 2020-02-06 ENCOUNTER — ANESTHESIA (OUTPATIENT)
Dept: OPERATING ROOM | Age: 65
End: 2020-02-06
Payer: COMMERCIAL

## 2020-02-06 ENCOUNTER — HOSPITAL ENCOUNTER (OUTPATIENT)
Age: 65
Setting detail: OUTPATIENT SURGERY
Discharge: HOME | End: 2020-02-06
Attending: SPECIALIST | Admitting: SPECIALIST
Payer: COMMERCIAL

## 2020-02-06 VITALS
SYSTOLIC BLOOD PRESSURE: 150 MMHG | DIASTOLIC BLOOD PRESSURE: 98 MMHG | RESPIRATION RATE: 16 BRPM | HEIGHT: 69 IN | BODY MASS INDEX: 31.84 KG/M2 | TEMPERATURE: 98 F | WEIGHT: 215 LBS | HEART RATE: 64 BPM | OXYGEN SATURATION: 95 %

## 2020-02-06 VITALS — DIASTOLIC BLOOD PRESSURE: 78 MMHG | SYSTOLIC BLOOD PRESSURE: 122 MMHG | OXYGEN SATURATION: 98 %

## 2020-02-06 PROCEDURE — 3700000000 HC ANESTHESIA ATTENDED CARE: Performed by: SPECIALIST

## 2020-02-06 PROCEDURE — 3609017100 HC EGD: Performed by: SPECIALIST

## 2020-02-06 PROCEDURE — 3700000001 HC ADD 15 MINUTES (ANESTHESIA): Performed by: SPECIALIST

## 2020-02-06 PROCEDURE — 7100000011 HC PHASE II RECOVERY - ADDTL 15 MIN: Performed by: SPECIALIST

## 2020-02-06 PROCEDURE — 2580000003 HC RX 258: Performed by: SPECIALIST

## 2020-02-06 PROCEDURE — 2500000003 HC RX 250 WO HCPCS: Performed by: NURSE ANESTHETIST, CERTIFIED REGISTERED

## 2020-02-06 PROCEDURE — 7100000010 HC PHASE II RECOVERY - FIRST 15 MIN: Performed by: SPECIALIST

## 2020-02-06 PROCEDURE — 2709999900 HC NON-CHARGEABLE SUPPLY: Performed by: SPECIALIST

## 2020-02-06 PROCEDURE — 6360000002 HC RX W HCPCS: Performed by: NURSE ANESTHETIST, CERTIFIED REGISTERED

## 2020-02-06 RX ORDER — SODIUM CHLORIDE, SODIUM LACTATE, POTASSIUM CHLORIDE, CALCIUM CHLORIDE 600; 310; 30; 20 MG/100ML; MG/100ML; MG/100ML; MG/100ML
INJECTION, SOLUTION INTRAVENOUS CONTINUOUS
Status: DISCONTINUED | OUTPATIENT
Start: 2020-02-06 | End: 2020-02-06 | Stop reason: HOSPADM

## 2020-02-06 RX ORDER — PROPOFOL 10 MG/ML
INJECTION, EMULSION INTRAVENOUS PRN
Status: DISCONTINUED | OUTPATIENT
Start: 2020-02-06 | End: 2020-02-06 | Stop reason: SDUPTHER

## 2020-02-06 RX ORDER — LIDOCAINE HYDROCHLORIDE 20 MG/ML
INJECTION, SOLUTION EPIDURAL; INFILTRATION; INTRACAUDAL; PERINEURAL PRN
Status: DISCONTINUED | OUTPATIENT
Start: 2020-02-06 | End: 2020-02-06 | Stop reason: SDUPTHER

## 2020-02-06 RX ADMIN — SODIUM CHLORIDE, POTASSIUM CHLORIDE, SODIUM LACTATE AND CALCIUM CHLORIDE: 600; 310; 30; 20 INJECTION, SOLUTION INTRAVENOUS at 08:14

## 2020-02-06 RX ADMIN — PROPOFOL 200 MG: 10 INJECTION, EMULSION INTRAVENOUS at 08:31

## 2020-02-06 RX ADMIN — LIDOCAINE HYDROCHLORIDE 100 MG: 20 INJECTION, SOLUTION EPIDURAL; INFILTRATION; INTRACAUDAL; PERINEURAL at 08:31

## 2020-02-06 ASSESSMENT — PAIN SCALES - GENERAL: PAINLEVEL_OUTOF10: 0

## 2020-02-06 ASSESSMENT — PAIN - FUNCTIONAL ASSESSMENT
PAIN_FUNCTIONAL_ASSESSMENT: 0-10
PAIN_FUNCTIONAL_ASSESSMENT: 0-10

## 2020-02-06 NOTE — PROGRESS NOTES
9882 Received from OR, family at bedside. Placed on monitor. Denies pain, nausea. 1871 Dr[address] at bedside updating family and patient on procedure. 3000 Discharge instructions reviewed with patient/family. 5521 Discharge to car via wheelchair.   Elias Jacobs

## 2020-02-06 NOTE — BRIEF OP NOTE
BRIEF EGD REPORT:     Photos and full EGD report available by going to [employer] review\" then \"procedures\" then  \"EGD\" then \"View Endoscopy Report\"     Impression:    1) small hiatal hernia with esophageal ring and mild erosive esophagitis- LA Grade B    2) otherwise normal- no Chen's esophagus        Suggest:   1) continue Rx for GERD

## 2020-10-26 ENCOUNTER — HOSPITAL ENCOUNTER (OUTPATIENT)
Age: 65
Discharge: HOME | End: 2020-10-26
Payer: COMMERCIAL

## 2020-10-26 LAB
ALBUMIN SERPL-MCNC: 4.2 GM/DL (ref 3.4–5)
ALP BLD-CCNC: 63 IU/L (ref 40–128)
ALT SERPL-CCNC: 17 U/L (ref 10–40)
ANION GAP SERPL CALCULATED.3IONS-SCNC: 10 MMOL/L (ref 4–16)
AST SERPL-CCNC: 19 IU/L (ref 15–37)
BILIRUB SERPL-MCNC: 0.5 MG/DL (ref 0–1)
BUN BLDV-MCNC: 19 MG/DL (ref 6–23)
CALCIUM SERPL-MCNC: 9 MG/DL (ref 8.3–10.6)
CHLORIDE BLD-SCNC: 104 MMOL/L (ref 99–110)
CHOLESTEROL: 151 MG/DL
CO2: 25 MMOL/L (ref 21–32)
CREAT SERPL-MCNC: 1.5 MG/DL (ref 0.9–1.3)
ESTIMATED AVERAGE GLUCOSE: 131 MG/DL
GFR AFRICAN AMERICAN: 57 ML/MIN/1.73M2
GFR NON-AFRICAN AMERICAN: 47 ML/MIN/1.73M2
GLUCOSE BLD-MCNC: 120 MG/DL (ref 70–99)
HBA1C MFR BLD: 6.2 % (ref 4.2–6.3)
HDLC SERPL-MCNC: 50 MG/DL
LDL CHOLESTEROL DIRECT: 87 MG/DL
POTASSIUM SERPL-SCNC: 4.5 MMOL/L (ref 3.5–5.1)
SODIUM BLD-SCNC: 139 MMOL/L (ref 135–145)
TOTAL PROTEIN: 6.4 GM/DL (ref 6.4–8.2)
TRIGL SERPL-MCNC: 157 MG/DL

## 2020-10-26 PROCEDURE — 36415 COLL VENOUS BLD VENIPUNCTURE: CPT

## 2020-10-26 PROCEDURE — 80061 LIPID PANEL: CPT

## 2020-10-26 PROCEDURE — 83721 ASSAY OF BLOOD LIPOPROTEIN: CPT

## 2020-10-26 PROCEDURE — 83036 HEMOGLOBIN GLYCOSYLATED A1C: CPT

## 2020-10-26 PROCEDURE — 80053 COMPREHEN METABOLIC PANEL: CPT

## 2021-01-07 ENCOUNTER — TELEPHONE (OUTPATIENT)
Dept: FAMILY MEDICINE CLINIC | Age: 66
End: 2021-01-07

## 2021-01-07 DIAGNOSIS — U07.1 COVID-19: Primary | ICD-10-CM

## 2021-01-08 LAB — SARS-COV-2: NEGATIVE

## 2021-03-18 ENCOUNTER — HOSPITAL ENCOUNTER (OUTPATIENT)
Age: 66
Discharge: HOME | End: 2021-03-18
Payer: COMMERCIAL

## 2021-03-18 LAB
BACTERIA: ABNORMAL /HPF
BILIRUBIN URINE: NEGATIVE MG/DL
BLOOD, URINE: NEGATIVE
CLARITY: CLEAR
COLOR: YELLOW
CREATININE URINE: 239.6 MG/DL (ref 39–259)
GLUCOSE, URINE: NEGATIVE MG/DL
GRANULAR CASTS: 4 /LPF
KETONES, URINE: NEGATIVE MG/DL
LEUKOCYTE ESTERASE, URINE: NEGATIVE
MAGNESIUM: 2.1 MG/DL (ref 1.8–2.4)
MUCUS: ABNORMAL HPF
NITRITE URINE, QUANTITATIVE: NEGATIVE
PH, URINE: 5 (ref 5–8)
PHOSPHORUS: 3 MG/DL (ref 2.5–4.9)
PROT/CREAT RATIO, UR: 0.1
PROTEIN UA: 30 MG/DL
RBC URINE: 1 /HPF (ref 0–3)
SPECIFIC GRAVITY UA: 1.01 (ref 1–1.03)
TRICHOMONAS: ABNORMAL /HPF
URINE TOTAL PROTEIN: 28.2 MG/DL
UROBILINOGEN, URINE: NEGATIVE MG/DL (ref 0.2–1)
WBC UA: 1 /HPF (ref 0–2)

## 2021-03-18 PROCEDURE — 84156 ASSAY OF PROTEIN URINE: CPT

## 2021-03-18 PROCEDURE — 36415 COLL VENOUS BLD VENIPUNCTURE: CPT

## 2021-03-18 PROCEDURE — 83735 ASSAY OF MAGNESIUM: CPT

## 2021-03-18 PROCEDURE — 81001 URINALYSIS AUTO W/SCOPE: CPT

## 2021-03-18 PROCEDURE — 82570 ASSAY OF URINE CREATININE: CPT

## 2021-03-18 PROCEDURE — 86769 SARS-COV-2 COVID-19 ANTIBODY: CPT

## 2021-03-18 PROCEDURE — 83835 ASSAY OF METANEPHRINES: CPT

## 2021-03-18 PROCEDURE — 84244 ASSAY OF RENIN: CPT

## 2021-03-18 PROCEDURE — 84100 ASSAY OF PHOSPHORUS: CPT

## 2021-03-18 PROCEDURE — 82088 ASSAY OF ALDOSTERONE: CPT

## 2021-03-19 LAB — SARS-COV-2, IGG: NEGATIVE

## 2021-03-20 LAB — ALDOSTERONE: 16.9 NG/DL

## 2021-03-21 LAB
METANEPH/PLASMA INTERP: NORMAL
METANEPHRINE, PLASMA: 0.17 NMOL/L (ref 0–0.49)
NORMETANEPHRINE PLASMA: 0.75 NMOL/L (ref 0–0.89)

## 2021-03-29 LAB — RENIN PLASMA: 73.5 NG/ML/HR

## 2023-02-24 ENCOUNTER — TELEPHONE (OUTPATIENT)
Dept: GASTROENTEROLOGY | Age: 68
End: 2023-02-24

## 2023-03-07 ENCOUNTER — TELEPHONE (OUTPATIENT)
Dept: GASTROENTEROLOGY | Age: 68
End: 2023-03-07

## 2023-03-07 NOTE — TELEPHONE ENCOUNTER
First Attempt to Reach Patient. Left message for the patient to call back to schedule cscope, and also ask regarding the EGD. Per Dr. Jayesh Goldsmith, he doesn't need an EGD if its regarding Chen's because his last EGD did not show Chen's.

## 2023-03-14 ENCOUNTER — TELEPHONE (OUTPATIENT)
Dept: GASTROENTEROLOGY | Age: 68
End: 2023-03-14

## 2023-03-14 NOTE — TELEPHONE ENCOUNTER
Final Attempt to Reach Patient. Tried to call patient to schedule cscope, and also ask regarding the EGD, but there was no answer and no voicemail on the number that the patient provided to registration. Per Dr. Manuel Orr, he doesn't need an EGD if its regarding Chen's because his last EGD did not show Chen's.

## 2023-03-24 ENCOUNTER — APPOINTMENT (OUTPATIENT)
Dept: CT IMAGING | Age: 68
End: 2023-03-24
Payer: COMMERCIAL

## 2023-03-24 ENCOUNTER — HOSPITAL ENCOUNTER (INPATIENT)
Age: 68
LOS: 1 days | Discharge: HOME | End: 2023-03-25
Attending: STUDENT IN AN ORGANIZED HEALTH CARE EDUCATION/TRAINING PROGRAM | Admitting: STUDENT IN AN ORGANIZED HEALTH CARE EDUCATION/TRAINING PROGRAM
Payer: COMMERCIAL

## 2023-03-24 DIAGNOSIS — N17.9 ACUTE KIDNEY INJURY SUPERIMPOSED ON CHRONIC KIDNEY DISEASE (HCC): Primary | ICD-10-CM

## 2023-03-24 DIAGNOSIS — R10.84 GENERALIZED ABDOMINAL PAIN: ICD-10-CM

## 2023-03-24 DIAGNOSIS — R11.2 NAUSEA VOMITING AND DIARRHEA: ICD-10-CM

## 2023-03-24 DIAGNOSIS — R19.7 NAUSEA VOMITING AND DIARRHEA: ICD-10-CM

## 2023-03-24 DIAGNOSIS — K52.9 GASTROENTERITIS: ICD-10-CM

## 2023-03-24 DIAGNOSIS — Z90.49 H/O COLECTOMY: ICD-10-CM

## 2023-03-24 DIAGNOSIS — N18.9 ACUTE KIDNEY INJURY SUPERIMPOSED ON CHRONIC KIDNEY DISEASE (HCC): Primary | ICD-10-CM

## 2023-03-24 LAB
ALBUMIN SERPL-MCNC: 3.5 GM/DL (ref 3.4–5)
ALBUMIN SERPL-MCNC: 4.2 GM/DL (ref 3.4–5)
ALP BLD-CCNC: 197 IU/L (ref 40–129)
ALT SERPL-CCNC: 77 U/L (ref 10–40)
ANION GAP SERPL CALCULATED.3IONS-SCNC: 11 MMOL/L (ref 4–16)
ANION GAP SERPL CALCULATED.3IONS-SCNC: 18 MMOL/L (ref 4–16)
AST SERPL-CCNC: 38 IU/L (ref 15–37)
ASTROVIRUS PCR: NOT DETECTED
BACTERIA: NEGATIVE /HPF
BASOPHILS ABSOLUTE: 0 K/CU MM
BASOPHILS RELATIVE PERCENT: 0.3 % (ref 0–1)
BILIRUB SERPL-MCNC: 0.5 MG/DL (ref 0–1)
BILIRUBIN URINE: NEGATIVE MG/DL
BLOOD, URINE: NEGATIVE
BUN SERPL-MCNC: 45 MG/DL (ref 6–23)
BUN SERPL-MCNC: 47 MG/DL (ref 6–23)
C CAYETANENSIS DNA SPEC QL NAA+PROBE: NOT DETECTED
CALCIUM SERPL-MCNC: 8.1 MG/DL (ref 8.3–10.6)
CALCIUM SERPL-MCNC: 8.7 MG/DL (ref 8.3–10.6)
CAMPY SP DNA.DIARRHEA STL QL NAA+PROBE: NOT DETECTED
CHLORIDE BLD-SCNC: 101 MMOL/L (ref 99–110)
CHLORIDE BLD-SCNC: 99 MMOL/L (ref 99–110)
CLARITY: CLEAR
CO2: 12 MMOL/L (ref 21–32)
CO2: 15 MMOL/L (ref 21–32)
COLOR: YELLOW
CREAT SERPL-MCNC: 2.6 MG/DL (ref 0.9–1.3)
CREAT SERPL-MCNC: 3.2 MG/DL (ref 0.9–1.3)
CRYPTOSP DNA SPEC QL NAA+PROBE: NOT DETECTED
DIFFERENTIAL TYPE: ABNORMAL
E COLI DNA SPEC QL NAA+PROBE: NOT DETECTED
E COLI ENTEROAGGREGATIVE PCR: NOT DETECTED
E COLI ENTEROPATHOGENIC PCR: NOT DETECTED
E COLI ENTEROTOXIGENIC PCR: NOT DETECTED
E COLI O157H7 DNA SPEC QL NAA+PROBE: NOT DETECTED
E HISTOLYT DNA SPEC QL NAA+PROBE: NOT DETECTED
EC STX1+STX2 + H7 FLIC SPEC NAA+PROBE: NOT DETECTED
EOSINOPHILS ABSOLUTE: 0 K/CU MM
EOSINOPHILS RELATIVE PERCENT: 0 % (ref 0–3)
G LAMBLIA DNA SPEC QL NAA+PROBE: NOT DETECTED
GFR SERPL CREATININE-BSD FRML MDRD: 20 ML/MIN/1.73M2
GFR SERPL CREATININE-BSD FRML MDRD: 26 ML/MIN/1.73M2
GLUCOSE SERPL-MCNC: 119 MG/DL (ref 70–99)
GLUCOSE SERPL-MCNC: 134 MG/DL (ref 70–99)
GLUCOSE, URINE: NEGATIVE MG/DL
GRANULAR CASTS: 12 /LPF
HADV DNA SPEC QL NAA+PROBE: NOT DETECTED
HCT VFR BLD CALC: 52 % (ref 42–52)
HEMOGLOBIN: 17.9 GM/DL (ref 13.5–18)
IMMATURE NEUTROPHIL %: 0.4 % (ref 0–0.43)
KETONES, URINE: NEGATIVE MG/DL
LACTATE: 0.9 MMOL/L (ref 0.5–1.9)
LEUKOCYTE ESTERASE, URINE: NEGATIVE
LIPASE: 46 IU/L (ref 13–60)
LYMPHOCYTES ABSOLUTE: 1 K/CU MM
LYMPHOCYTES RELATIVE PERCENT: 8.8 % (ref 24–44)
MAGNESIUM: 2.1 MG/DL (ref 1.8–2.4)
MCH RBC QN AUTO: 31.2 PG (ref 27–31)
MCHC RBC AUTO-ENTMCNC: 34.4 % (ref 32–36)
MCV RBC AUTO: 90.6 FL (ref 78–100)
MONOCYTES ABSOLUTE: 1.1 K/CU MM
MONOCYTES RELATIVE PERCENT: 10.2 % (ref 0–4)
MUCUS: ABNORMAL HPF
NITRITE URINE, QUANTITATIVE: NEGATIVE
NOROVIRUS RNA SPEC QL NAA+PROBE: NOT DETECTED
NUCLEATED RBC %: 0 %
P SHIGELLOIDES DNA STL QL NAA+PROBE: NOT DETECTED
PDW BLD-RTO: 14 % (ref 11.7–14.9)
PH, URINE: 6 (ref 5–8)
PHOSPHORUS: 4.6 MG/DL (ref 2.5–4.9)
PLATELET # BLD: 263 K/CU MM (ref 140–440)
PMV BLD AUTO: 10 FL (ref 7.5–11.1)
POTASSIUM SERPL-SCNC: 3.6 MMOL/L (ref 3.5–5.1)
POTASSIUM SERPL-SCNC: 3.9 MMOL/L (ref 3.5–5.1)
PROTEIN UA: 30 MG/DL
RBC # BLD: 5.74 M/CU MM (ref 4.6–6.2)
RBC URINE: <1 /HPF (ref 0–3)
RV RNA SPEC QL NAA+PROBE: ABNORMAL
SALMONELLA DNA SPEC QL NAA+PROBE: NOT DETECTED
SAPOVIRUS PCR: NOT DETECTED
SEGMENTED NEUTROPHILS ABSOLUTE COUNT: 8.9 K/CU MM
SEGMENTED NEUTROPHILS RELATIVE PERCENT: 80.3 % (ref 36–66)
SODIUM BLD-SCNC: 127 MMOL/L (ref 135–145)
SODIUM BLD-SCNC: 129 MMOL/L (ref 135–145)
SODIUM URINE: 10 MMOL/L (ref 35–167)
SPECIFIC GRAVITY UA: 1.02 (ref 1–1.03)
TOTAL IMMATURE NEUTOROPHIL: 0.04 K/CU MM
TOTAL NUCLEATED RBC: 0 K/CU MM
TOTAL PROTEIN: 7.6 GM/DL (ref 6.4–8.2)
TRICHOMONAS: ABNORMAL /HPF
UROBILINOGEN, URINE: 0.2 MG/DL (ref 0.2–1)
V CHOLERAE DNA SPEC QL NAA+PROBE: NOT DETECTED
VIBRIO DNA SPEC NAA+PROBE: NOT DETECTED
WBC # BLD: 11.1 K/CU MM (ref 4–10.5)
WBC UA: 2 /HPF (ref 0–2)
YERSINIA DNA SPEC NAA+PROBE: NOT DETECTED

## 2023-03-24 PROCEDURE — 83605 ASSAY OF LACTIC ACID: CPT

## 2023-03-24 PROCEDURE — 85025 COMPLETE CBC W/AUTO DIFF WBC: CPT

## 2023-03-24 PROCEDURE — 80069 RENAL FUNCTION PANEL: CPT

## 2023-03-24 PROCEDURE — 82570 ASSAY OF URINE CREATININE: CPT

## 2023-03-24 PROCEDURE — 2580000003 HC RX 258: Performed by: PHYSICIAN ASSISTANT

## 2023-03-24 PROCEDURE — 83690 ASSAY OF LIPASE: CPT

## 2023-03-24 PROCEDURE — 81001 URINALYSIS AUTO W/SCOPE: CPT

## 2023-03-24 PROCEDURE — 84156 ASSAY OF PROTEIN URINE: CPT

## 2023-03-24 PROCEDURE — 99285 EMERGENCY DEPT VISIT HI MDM: CPT

## 2023-03-24 PROCEDURE — 87507 IADNA-DNA/RNA PROBE TQ 12-25: CPT

## 2023-03-24 PROCEDURE — 6360000002 HC RX W HCPCS: Performed by: PHYSICIAN ASSISTANT

## 2023-03-24 PROCEDURE — 6360000002 HC RX W HCPCS: Performed by: STUDENT IN AN ORGANIZED HEALTH CARE EDUCATION/TRAINING PROGRAM

## 2023-03-24 PROCEDURE — 74176 CT ABD & PELVIS W/O CONTRAST: CPT

## 2023-03-24 PROCEDURE — 94761 N-INVAS EAR/PLS OXIMETRY MLT: CPT

## 2023-03-24 PROCEDURE — 2580000003 HC RX 258: Performed by: STUDENT IN AN ORGANIZED HEALTH CARE EDUCATION/TRAINING PROGRAM

## 2023-03-24 PROCEDURE — 96375 TX/PRO/DX INJ NEW DRUG ADDON: CPT

## 2023-03-24 PROCEDURE — 96374 THER/PROPH/DIAG INJ IV PUSH: CPT

## 2023-03-24 PROCEDURE — 80053 COMPREHEN METABOLIC PANEL: CPT

## 2023-03-24 PROCEDURE — 36415 COLL VENOUS BLD VENIPUNCTURE: CPT

## 2023-03-24 PROCEDURE — 83735 ASSAY OF MAGNESIUM: CPT

## 2023-03-24 PROCEDURE — 84300 ASSAY OF URINE SODIUM: CPT

## 2023-03-24 PROCEDURE — 2500000003 HC RX 250 WO HCPCS: Performed by: STUDENT IN AN ORGANIZED HEALTH CARE EDUCATION/TRAINING PROGRAM

## 2023-03-24 PROCEDURE — 1200000000 HC SEMI PRIVATE

## 2023-03-24 RX ORDER — METOPROLOL SUCCINATE 25 MG/1
25 TABLET, EXTENDED RELEASE ORAL DAILY
Status: DISCONTINUED | OUTPATIENT
Start: 2023-03-24 | End: 2023-03-25 | Stop reason: HOSPADM

## 2023-03-24 RX ORDER — ACETAMINOPHEN 325 MG/1
650 TABLET ORAL EVERY 6 HOURS PRN
Status: DISCONTINUED | OUTPATIENT
Start: 2023-03-24 | End: 2023-03-25 | Stop reason: HOSPADM

## 2023-03-24 RX ORDER — MORPHINE SULFATE 4 MG/ML
4 INJECTION, SOLUTION INTRAMUSCULAR; INTRAVENOUS ONCE
Status: COMPLETED | OUTPATIENT
Start: 2023-03-24 | End: 2023-03-24

## 2023-03-24 RX ORDER — SODIUM CHLORIDE 9 MG/ML
INJECTION, SOLUTION INTRAVENOUS PRN
Status: DISCONTINUED | OUTPATIENT
Start: 2023-03-24 | End: 2023-03-25 | Stop reason: HOSPADM

## 2023-03-24 RX ORDER — 0.9 % SODIUM CHLORIDE 0.9 %
1000 INTRAVENOUS SOLUTION INTRAVENOUS ONCE
Status: COMPLETED | OUTPATIENT
Start: 2023-03-24 | End: 2023-03-24

## 2023-03-24 RX ORDER — HEPARIN SODIUM 5000 [USP'U]/ML
5000 INJECTION, SOLUTION INTRAVENOUS; SUBCUTANEOUS EVERY 8 HOURS SCHEDULED
Status: DISCONTINUED | OUTPATIENT
Start: 2023-03-24 | End: 2023-03-25 | Stop reason: HOSPADM

## 2023-03-24 RX ORDER — ONDANSETRON 2 MG/ML
4 INJECTION INTRAMUSCULAR; INTRAVENOUS EVERY 6 HOURS PRN
Status: DISCONTINUED | OUTPATIENT
Start: 2023-03-24 | End: 2023-03-25 | Stop reason: HOSPADM

## 2023-03-24 RX ORDER — SODIUM CHLORIDE 0.9 % (FLUSH) 0.9 %
5-40 SYRINGE (ML) INJECTION EVERY 12 HOURS SCHEDULED
Status: DISCONTINUED | OUTPATIENT
Start: 2023-03-24 | End: 2023-03-25 | Stop reason: HOSPADM

## 2023-03-24 RX ORDER — ONDANSETRON 2 MG/ML
4 INJECTION INTRAMUSCULAR; INTRAVENOUS EVERY 6 HOURS PRN
Status: DISCONTINUED | OUTPATIENT
Start: 2023-03-24 | End: 2023-03-24 | Stop reason: SDUPTHER

## 2023-03-24 RX ORDER — SODIUM CHLORIDE, SODIUM LACTATE, POTASSIUM CHLORIDE, CALCIUM CHLORIDE 600; 310; 30; 20 MG/100ML; MG/100ML; MG/100ML; MG/100ML
INJECTION, SOLUTION INTRAVENOUS CONTINUOUS
Status: CANCELLED | OUTPATIENT
Start: 2023-03-24 | End: 2023-03-25

## 2023-03-24 RX ORDER — SODIUM CHLORIDE 0.9 % (FLUSH) 0.9 %
5-40 SYRINGE (ML) INJECTION PRN
Status: DISCONTINUED | OUTPATIENT
Start: 2023-03-24 | End: 2023-03-25 | Stop reason: HOSPADM

## 2023-03-24 RX ORDER — ACETAMINOPHEN 650 MG/1
650 SUPPOSITORY RECTAL EVERY 6 HOURS PRN
Status: DISCONTINUED | OUTPATIENT
Start: 2023-03-24 | End: 2023-03-25 | Stop reason: HOSPADM

## 2023-03-24 RX ORDER — CIPROFLOXACIN 2 MG/ML
400 INJECTION, SOLUTION INTRAVENOUS EVERY 12 HOURS
Status: DISCONTINUED | OUTPATIENT
Start: 2023-03-24 | End: 2023-03-24

## 2023-03-24 RX ORDER — ONDANSETRON 4 MG/1
4 TABLET, ORALLY DISINTEGRATING ORAL EVERY 8 HOURS PRN
Status: DISCONTINUED | OUTPATIENT
Start: 2023-03-24 | End: 2023-03-25 | Stop reason: HOSPADM

## 2023-03-24 RX ORDER — METRONIDAZOLE 500 MG/100ML
500 INJECTION, SOLUTION INTRAVENOUS EVERY 8 HOURS
Status: DISCONTINUED | OUTPATIENT
Start: 2023-03-24 | End: 2023-03-24

## 2023-03-24 RX ORDER — POLYETHYLENE GLYCOL 3350 17 G/17G
17 POWDER, FOR SOLUTION ORAL DAILY PRN
Status: DISCONTINUED | OUTPATIENT
Start: 2023-03-24 | End: 2023-03-25 | Stop reason: HOSPADM

## 2023-03-24 RX ADMIN — SODIUM CHLORIDE, PRESERVATIVE FREE 10 ML: 5 INJECTION INTRAVENOUS at 14:24

## 2023-03-24 RX ADMIN — METRONIDAZOLE 500 MG: 500 INJECTION, SOLUTION INTRAVENOUS at 12:06

## 2023-03-24 RX ADMIN — ONDANSETRON 4 MG: 2 INJECTION INTRAMUSCULAR; INTRAVENOUS at 08:13

## 2023-03-24 RX ADMIN — SODIUM BICARBONATE: 84 INJECTION, SOLUTION INTRAVENOUS at 15:39

## 2023-03-24 RX ADMIN — SODIUM CHLORIDE 1000 ML: 9 INJECTION, SOLUTION INTRAVENOUS at 08:12

## 2023-03-24 RX ADMIN — CIPROFLOXACIN 400 MG: 2 INJECTION, SOLUTION INTRAVENOUS at 14:22

## 2023-03-24 RX ADMIN — MORPHINE SULFATE 4 MG: 4 INJECTION, SOLUTION INTRAMUSCULAR; INTRAVENOUS at 08:13

## 2023-03-24 RX ADMIN — HEPARIN SODIUM 5000 UNITS: 5000 INJECTION INTRAVENOUS; SUBCUTANEOUS at 14:23

## 2023-03-24 RX ADMIN — HYDROMORPHONE HYDROCHLORIDE 0.5 MG: 1 INJECTION, SOLUTION INTRAMUSCULAR; INTRAVENOUS; SUBCUTANEOUS at 12:02

## 2023-03-24 ASSESSMENT — PAIN DESCRIPTION - DESCRIPTORS
DESCRIPTORS: CRAMPING
DESCRIPTORS: DULL

## 2023-03-24 ASSESSMENT — ENCOUNTER SYMPTOMS
NAUSEA: 1
ABDOMINAL PAIN: 1
VOMITING: 1
SHORTNESS OF BREATH: 0
DIARRHEA: 1

## 2023-03-24 ASSESSMENT — PAIN DESCRIPTION - FREQUENCY: FREQUENCY: INTERMITTENT

## 2023-03-24 ASSESSMENT — PAIN DESCRIPTION - ORIENTATION
ORIENTATION: RIGHT
ORIENTATION: MID

## 2023-03-24 ASSESSMENT — PAIN SCALES - GENERAL
PAINLEVEL_OUTOF10: 4
PAINLEVEL_OUTOF10: 0
PAINLEVEL_OUTOF10: 5

## 2023-03-24 ASSESSMENT — PAIN DESCRIPTION - LOCATION
LOCATION: ABDOMEN;FLANK
LOCATION: ABDOMEN

## 2023-03-24 ASSESSMENT — PAIN DESCRIPTION - PAIN TYPE: TYPE: ACUTE PAIN

## 2023-03-24 NOTE — CARE COORDINATION
MCG criteria for Gastroenteritis reviewed at this time, criteria supports Inpatient Admission. ELVA,RN/CM

## 2023-03-24 NOTE — ED NOTES
Medication History  [facility]    Patient Name: Abdirahman Connolly 1955     Medication history has been completed by: Irina Dean CPhT    Source(s) of information: patient     Primary Care Physician: Adriane Lopez DO     Pharmacy: [facility]    Allergies as of 03/24/2023 - Fully Reviewed 03/24/2023   Allergen Reaction Noted    Chocolate Other (See Comments) 10/16/2014    Cocoa Other (See Comments) 10/16/2014    Corn-containing products  10/16/2014    Eggs or egg-derived products  07/25/0004    Garlic  45/46/4407    Lac bovis  09/06/2017    Onion  09/06/2017    Soybean-containing drug products  10/16/2014    Tomato  09/06/2017    Wheat bran  10/16/2014        Prior to Admission medications    Medication Sig Start Date End Date Taking?  Authorizing Provider   Multiple Vitamin (MULTIVITAMIN ADULT PO) Take 1 tablet by mouth daily    Historical Provider, MD   Cyanocobalamin (B-12) 5000 MCG CAPS Take by mouth daily    Historical Provider, MD   Coenzyme Q10 (CO Q 10) 100 MG CAPS Take by mouth daily    Historical Provider, MD   Magnesium 300 MG CAPS Take by mouth daily    Historical Provider, MD   Probiotic Product (PROBIOTIC DAILY PO) Take by mouth daily    Historical Provider, MD   NONFORMULARY daily Mineral    Historical Provider, MD     Medications removed from list (include reason, ex. noncompliance, medication cost, therapy complete etc.):   Chlorthalidone not taking  Olmesartan not taking  Metoprolol ER not taking (ordered)    Comments:  Patient reports he takes no prescription medications, only vitamin/mineral supplments    To my knowledge the above medication history is accurate as of 3/24/2023 9:51 AM.   Irina Dean CPhT   3/24/2023 9:51 AM

## 2023-03-24 NOTE — ED NOTES
2002/colon    Chronic kidney disease (CKD) stage G3a/A1, moderately decreased glomerular filtration rate (GFR) between 45-59 mL/min/1.73 square meter and albuminuria creatinine ratio less than 30 mg/g ([facility]) 12/19/2016    Hyperlipidemia 12/19/2016    Hypertension        Assessment    Vitals/MEWS: MEWS Score: 1  Level of Consciousness: Alert (0)   Vitals:    03/24/23 0755 03/24/23 0813   BP: (!) 163/103    Pulse: 81    Resp: 16 17   Temp: 98 °F (36.7 °C)    TempSrc: Oral    SpO2: 97%    Weight: 190 lb (86.2 kg)    Height: 5' 9\" (1.753 m)      FiO2 (%): room air  O2 Flow Rate:      Cardiac Rhythm: NSR  Pain Assessment:  [] Verbal [] Thermon Barbone Scale  Pain Scale: Pain Assessment  Pain Assessment: 0-10  Pain Level: 5  Pain Location: Abdomen, Flank  Pain Orientation: Right  Pain Descriptors: Dull  Pain Type: Acute pain  Pain Frequency: Intermittent  Last documented pain score (0-10 scale) Pain Level: 5  Last documented pain medication administered: 0813 4mg morphine  Mental Status: alert, coherent, logical, thought processes intact and able to concentrate and follow conversation  NIH Score: NIH     C-SSRS:    Bedside swallow:    Anmol Coma Scale (GCS): Anmol Coma Scale  Eye Opening: Spontaneous  Best Verbal Response: Oriented  Best Motor Response: Obeys commands  [place] Coma Scale Score: 15  Active LDA's:   Peripheral IV 03/24/23 Right Antecubital (Active)   Site Assessment Clean, dry & intact 03/24/23 0809     PO Status: unknown  Pertinent or High Risk Medications/Drips: no   o If Yes, please provide details:   Pending Blood Product Administration: no     You may also review the ED PT Care Timeline found under the Summary Nursing Index tab. Recommendation    Pending orders  Plan for Discharge (if known): Additional Comments: pt arrived from home today for abdominal pain since last night, pt has chronic kidney disease and is being admitted for STEPHON. Pt is alert, oriented and able to do ADL's independently. Wife has been at bedside   If any further questions, please call Sending RN at [phone]    Electronically signed by: Electronically signed by Bridget Phillips RN on 3/24/2023 at 9:39 AM       Bridget Phillips RN  03/24/23 6464

## 2023-03-24 NOTE — H&P
chloride flush  5-40 mL IntraVENous 2 times per day    heparin (porcine)  5,000 Units SubCUTAneous 3 times per day    ciprofloxacin  400 mg IntraVENous Q12H    metroNIDAZOLE  500 mg IntraVENous Q8H      Infusions:    sodium chloride      sodium bicarbonate infusion       PRN Meds: sodium chloride flush, 5-40 mL, PRN  sodium chloride, , PRN  ondansetron, 4 mg, Q8H PRN   Or  ondansetron, 4 mg, Q6H PRN  polyethylene glycol, 17 g, Daily PRN  acetaminophen, 650 mg, Q6H PRN   Or  acetaminophen, 650 mg, Q6H PRN  HYDROmorphone, 0.5 mg, Q6H PRN      Labs      CBC:   Recent Labs     03/24/23  0807   WBC 11.1*   HGB 17.9        BMP:    Recent Labs     03/24/23  0807   *   K 3.9   CL 99   CO2 12*   BUN 47*   CREATININE 3.2*   GLUCOSE 134*     Hepatic:   Recent Labs     03/24/23  0807   AST 38*   ALT 77*   BILITOT 0.5   ALKPHOS 197*     Lipids:   Lab Results   Component Value Date/Time    CHOL 151 10/26/2020 08:43 AM    HDL 50 10/26/2020 08:43 AM    TRIG 157 10/26/2020 08:43 AM     Hemoglobin A1C:   Lab Results   Component Value Date/Time    LABA1C 6.2 10/26/2020 08:43 AM     TSH: No results found for: TSH  Troponin: No results found for: TROPONINT  Lactic Acid: No results for input(s): LACTA in the last 72 hours. BNP: No results for input(s): PROBNP in the last 72 hours.   UA:  Lab Results   Component Value Date/Time    NITRU NEGATIVE 03/18/2021 07:50 AM    COLORU YELLOW 03/18/2021 07:50 AM    WBCUA 1 03/18/2021 07:50 AM    RBCUA 1 03/18/2021 07:50 AM    MUCUS RARE 03/18/2021 07:50 AM    TRICHOMONAS NONE SEEN 03/18/2021 07:50 AM    BACTERIA RARE 03/18/2021 07:50 AM    CLARITYU CLEAR 03/18/2021 07:50 AM    SPECGRAV 1.013 03/18/2021 07:50 AM    LEUKOCYTESUR NEGATIVE 03/18/2021 07:50 AM    UROBILINOGEN NEGATIVE 03/18/2021 07:50 AM    BILIRUBINUR NEGATIVE 03/18/2021 07:50 AM    BLOODU NEGATIVE 03/18/2021 07:50 AM    KETUA NEGATIVE 03/18/2021 07:50 AM     Urine Cultures: No results found for: LABURIN  Blood Cultures: No results found for: BC  No results found for: BLOODCULT2  Organism: No results found for: ORG    Imaging/Diagnostics Last 24 Hours   CT ABDOMEN PELVIS WO CONTRAST Additional Contrast? None    Result Date: 3/24/2023  EXAMINATION: CT OF THE ABDOMEN AND PELVIS WITHOUT CONTRAST 3/24/2023 8:32 am TECHNIQUE: CT of the abdomen and pelvis was performed without the administration of intravenous contrast. Multiplanar reformatted images are provided for review. Automated exposure control, iterative reconstruction, and/or weight based adjustment of the mA/kV was utilized to reduce the radiation dose to as low as reasonably achievable. COMPARISON: 05/04/2007 HISTORY: ORDERING SYSTEM PROVIDED HISTORY: diffuse abd pain, n/v/d x 5days; known h/o of CKD, psxh colon sx with remote colon CA TECHNOLOGIST PROVIDED HISTORY: Reason for exam:->diffuse abd pain, n/v/d x 5days; known h/o of CKD, psxh colon sx with remote colon CA Additional Contrast?->None Decision Support Exception - unselect if not a suspected or confirmed emergency medical condition->Emergency Medical Condition (MA) Reason for Exam: diffuse abd pain, n/v/d x 5days; known h/o of CKD, psxh colon sx with remote colon CA FINDINGS: Lower Chest:  Visualized portion of the lower chest demonstrates no acute abnormality. Organs: Status post cholecystectomy. The liver, spleen, kidneys, adrenals, pancreas, bile ducts, and stomach are without acute process. No suspicious renal lesions, with subcentimeter hypodensities too small to characterize. The ureters are nondilated. The bladder is decompressed which limits evaluation. GI/Bowel: Status post right hemicolectomy. Scattered fluid in the small bowel without significant distension. No evidence of obstruction. Pelvis: Moderately enlarged prostate. Peritoneum/Retroperitoneum: Moderate atherosclerosis. No lymphadenopathy. No free fluid or free air. Bones/Soft Tissues: No acute osseous abnormality.   Small fat containing bilateral

## 2023-03-24 NOTE — ED PROVIDER NOTES
Gastrointestinal:  Positive for abdominal pain, diarrhea, nausea and vomiting. Neurological:  Positive for headaches. Negative for weakness and numbness. Pertinent positives and negatives are stated within HPI    PAST HISTORY    has a past medical history of Arthritis of knee, Borderline diabetic, Cancer ([facility] [address] Chronic kidney disease (CKD) stage G3a/A1, moderately decreased glomerular filtration rate (GFR) between 45-59 mL/min/1.73 square meter and albuminuria creatinine ratio less than 30 mg/g ([facility] [address] Hyperlipidemia, and Hypertension. Past Surgical History:   Procedure Laterality Date    APPENDECTOMY      CHOLECYSTECTOMY      COLECTOMY      COLONOSCOPY  6/5/14    sm int hem, r hemicolectomy    COLONOSCOPY  10/18/2016    [facility]    ENDOSCOPY, COLON, DIAGNOSTIC  6/5/14     ulcer a ileocecal anastomosis, H H, erosive esophagitis    UPPER GASTROINTESTINAL ENDOSCOPY N/A 2/6/2020    EGD DIAGNOSTIC ONLY performed by Sergio Interiano MD at [place] [address]       Family History   Problem Relation Age of Onset    Heart Disease Father     High Blood Pressure Father     Pacemaker Father     Diabetes Maternal Grandmother     Hypertension Mother     Hypertension Sister     Diabetes Maternal Grandfather     Asthma Maternal Grandfather     Heart Attack Paternal Grandfather 52       Social History     Tobacco Use    Smoking status: Light Smoker     Packs/day: 0.25     Types: Cigars, Cigarettes    Smokeless tobacco: Never    Tobacco comments:     socially not very often the pt states    Vaping Use    Vaping Use: Never used   Substance Use Topics    Alcohol use: Yes     Comment: socially    Drug use: No       Chocolate, Cocoa, Corn-containing products, Eggs or egg-derived products, Garlic, [place] bov[place], Onion, Soybean-containing drug products, Tomato, and Wheat bran    The patients home medications have been reviewed.   Current Discharge Medication List        CONTINUE these medications which have NOT CHANGED    Details Multiple Vitamin (MULTIVITAMIN ADULT PO) Take 1 tablet by mouth daily      Cyanocobalamin (B-12) 5000 MCG CAPS Take by mouth daily      Coenzyme Q10 (CO Q 10) 100 MG CAPS Take by mouth daily      Magnesium 300 MG CAPS Take by mouth daily      Probiotic Product (PROBIOTIC DAILY PO) Take by mouth daily      NONFORMULARY daily Mineral               SCREENINGS        Clio Coma Scale  Eye Opening: Spontaneous  Best Verbal Response: Oriented  Best Motor Response: Obeys commands  Clio Coma Scale Score: 15                CIWA Assessment  BP: 116/70  Heart Rate: 59             PHYSICAL EXAM       ED Triage Vitals [03/24/23 0755]   BP Temp Temp Source Heart Rate Resp SpO2 Height Weight   (!) 163/103 98 °F (36.7 °C) Oral 81 16 97 % 5' 9\" (1.753 m) 190 lb (86.2 kg)       Physical Exam  Vitals and nursing note reviewed. Constitutional:       Appearance: Normal appearance. He is well-developed. He is not ill-appearing or diaphoretic. HENT:      Head: Normocephalic and atraumatic. Eyes:      General: No scleral icterus. Right eye: No discharge. Left eye: No discharge. Cardiovascular:      Rate and Rhythm: Normal rate and regular rhythm. Heart sounds: Normal heart sounds. No murmur heard. No friction rub. No gallop. Pulmonary:      Effort: Pulmonary effort is normal. No respiratory distress. Breath sounds: Normal breath sounds. No stridor. No wheezing or rales. Chest:      Chest wall: No tenderness. Abdominal:      General: Bowel sounds are normal. There is no distension. Palpations: Abdomen is soft. There is no mass. Tenderness: There is no abdominal tenderness. There is no guarding or rebound. Musculoskeletal:         General: No tenderness. Normal range of motion. Cervical back: Normal range of motion and neck supple. Skin:     General: Skin is warm and dry. Coloration: Skin is not pale.    Neurological:      Mental Status: He is alert and oriented to

## 2023-03-24 NOTE — CONSULTS
Patient:   Hermila Kruger    Date:  23  :  1955, 69 y.o. Nephrologist: Danni Galo MD  Provider: Maite Howard DO    Reason for Consult: Acute kidney injury with underlying chronic kidney disease stage IIIa    Consult requested by : Dr Nehal Benjamin MD    Chief Complaint:   Nausea vomiting and diarrhea with abdominal pain since Monday-which is 2023    HISTORY OF PRESENT ILLNESS/Brief hospital course  AND  brief background history    Mr. Sangita Ambrose, is a 68-year young male, who was brought to the emergency department with his wife with 4 days history of nausea, persistent vomiting and diarrhea and abdominal pain. The symptoms initially started Monday which is , he took some over-the-counter antiemetic agent but no nonsteroidal anti-inflammatory medication, but the symptoms continue to worsen which forces him to come to the emergency department. On arrival in our emergency department on 2023, he was afebrile but initial blood pressure was slightly up at 160/103 although did go down, his oxygen saturation was 97% while breathing ambient air. He underwent computed tomography of the abdomen and pelvis without administration of iodinated contrast, which showed scattered fluid-filled small bowel without distention, suggestive of gastroenteritis, but no other outstanding finding. Biochemical testing showed low sodium of 129, significant low serum bicarbonate of 12, and BUN/creatinine of 47 and 3.2 respectively. Additionally his ALT AST and alkaline phosphatase were slightly elevated. Other pertinent abnormal lab include slightly elevated white count of 11.1 thousand, rather high hemoconcentrated hemoglobin of 17.9 and hematocrit of 52.0. He was given 1 L of normal saline, empirical antimicrobial agent mainly ciprofloxacin and metronidazole initiated. He was subsequently admitted for further evaluation and management to medical floor.   Additionally Nausea, vomiting, diarrhea with abdominal pain    Medication :     Reviewed, see in epic    /71   Pulse 63   Temp 97.9 °F (36.6 °C) (Oral)   Resp 16   Ht 5' 9\" (1.753 m)   Wt 190 lb (86.2 kg)   SpO2 98%   BMI 28.06 kg/m²     PHYSICAL EXAM:  General appearance: Alert, awake and oriented  HEENT: At least 2+ conjunctival pallor  Neck: Supple  Heart: Regular rate and rhythm  LUNGS: Coarse breath sound  Abdomen: Soft, tender on palpation, no rebound rigidity  Extremities: No edema    LABS:  Reviewed, see in epic            IMPRESSION:  Acute kidney injury with underlying chronic kidney stage G IIIa /A2 to-he had roughly 65 mg of albuminuria recently as an outpatient-based on the available data likely from profound volume depletion accentuated by home angiotensin receptor blocker therapy-and perhaps chlorthalidone assuming he was taking them  Nausea vomiting diarrhea and abdominal pain with history of colon cancer-likely nonspecific viral illness unless he has some bacterial  dz -diarrhea combined with acute kidney injury likely caused  metabolic acidosis  History of hypertension, colon cancer    PLAN:    Isotonic bicarbonate solution has been added-reasonable at this point-until clinically he seems volume repleted-also GI panel was ordered but I do not think it is back yet-UA and urinary indicis are ordered-symptomatic treatment otherwise-his blood pressure seems to be acceptable-follow clinically and biochemically

## 2023-03-24 NOTE — PROGRESS NOTES
4 Eyes Skin Assessment     NAME:  Laura Khan  YOB: 1955  MEDICAL RECORD NUMBER:  [id number]    The patient is being assessed for  Admission    I agree that One RN has performed a thorough Head to Toe Skin Assessment on the patient. ALL assessment sites listed below have been assessed. Areas assessed by both nurses:    Head, Face, Ears, Shoulders, Back, Chest, Arms, Elbows, Hands, Sacrum. Buttock, Coccyx, Ischium, and Legs. Feet and Heels        Does the Patient have a Wound?  No noted wound(s)       Lalo Prevention initiated by RN: No   Wound Care Orders initiated by RN: No    Pressure Injury (Stage 3,4, Unstageable, DTI, NWPT, and Complex wounds) if present, place referral order by RN under : No    New and Established Ostomies, if present place, referral order under : No      Nurse 1 eSignature: Electronically signed by Valerie Guy RN on 3/24/23 at 12:25 PM EDT    **SHARE this note so that the co-signing nurse can place an eSignature**    Nurse 2 eSignature: {Esignature:[id number]}

## 2023-03-25 VITALS
RESPIRATION RATE: 16 BRPM | OXYGEN SATURATION: 97 % | DIASTOLIC BLOOD PRESSURE: 86 MMHG | HEIGHT: 69 IN | SYSTOLIC BLOOD PRESSURE: 144 MMHG | WEIGHT: 184.5 LBS | HEART RATE: 73 BPM | BODY MASS INDEX: 27.33 KG/M2 | TEMPERATURE: 98.3 F

## 2023-03-25 LAB
ALBUMIN SERPL-MCNC: 3.5 GM/DL (ref 3.4–5)
ALBUMIN SERPL-MCNC: 3.5 GM/DL (ref 3.4–5)
ALP BLD-CCNC: 287 IU/L (ref 40–129)
ALT SERPL-CCNC: 59 U/L (ref 10–40)
ANION GAP SERPL CALCULATED.3IONS-SCNC: 10 MMOL/L (ref 4–16)
AST SERPL-CCNC: 31 IU/L (ref 15–37)
BASOPHILS ABSOLUTE: 0 K/CU MM
BASOPHILS RELATIVE PERCENT: 0.5 % (ref 0–1)
BILIRUB SERPL-MCNC: 0.5 MG/DL (ref 0–1)
BILIRUBIN DIRECT: 0.2 MG/DL (ref 0–0.3)
BILIRUBIN, INDIRECT: 0.3 MG/DL (ref 0–0.7)
BUN SERPL-MCNC: 39 MG/DL (ref 6–23)
CALCIUM SERPL-MCNC: 8 MG/DL (ref 8.3–10.6)
CHLORIDE BLD-SCNC: 104 MMOL/L (ref 99–110)
CO2: 19 MMOL/L (ref 21–32)
CREAT SERPL-MCNC: 2.2 MG/DL (ref 0.9–1.3)
CREATININE URINE: 173.2 MG/DL (ref 39–259)
DIFFERENTIAL TYPE: ABNORMAL
EOSINOPHILS ABSOLUTE: 0.1 K/CU MM
EOSINOPHILS RELATIVE PERCENT: 0.9 % (ref 0–3)
GFR SERPL CREATININE-BSD FRML MDRD: 32 ML/MIN/1.73M2
GLUCOSE SERPL-MCNC: 120 MG/DL (ref 70–99)
HCT VFR BLD CALC: 43.7 % (ref 42–52)
HEMOGLOBIN: 15 GM/DL (ref 13.5–18)
IMMATURE NEUTROPHIL %: 0.3 % (ref 0–0.43)
LYMPHOCYTES ABSOLUTE: 1.3 K/CU MM
LYMPHOCYTES RELATIVE PERCENT: 19.3 % (ref 24–44)
MAGNESIUM: 1.9 MG/DL (ref 1.8–2.4)
MCH RBC QN AUTO: 31 PG (ref 27–31)
MCHC RBC AUTO-ENTMCNC: 34.3 % (ref 32–36)
MCV RBC AUTO: 90.3 FL (ref 78–100)
MONOCYTES ABSOLUTE: 0.9 K/CU MM
MONOCYTES RELATIVE PERCENT: 12.8 % (ref 0–4)
NUCLEATED RBC %: 0 %
PDW BLD-RTO: 14 % (ref 11.7–14.9)
PHOSPHORUS: 3 MG/DL (ref 2.5–4.9)
PLATELET # BLD: 218 K/CU MM (ref 140–440)
PMV BLD AUTO: 10 FL (ref 7.5–11.1)
POTASSIUM SERPL-SCNC: 3.3 MMOL/L (ref 3.5–5.1)
PROT/CREAT RATIO, UR: 0.2
RBC # BLD: 4.84 M/CU MM (ref 4.6–6.2)
SEGMENTED NEUTROPHILS ABSOLUTE COUNT: 4.4 K/CU MM
SEGMENTED NEUTROPHILS RELATIVE PERCENT: 66.2 % (ref 36–66)
SODIUM BLD-SCNC: 133 MMOL/L (ref 135–145)
TOTAL IMMATURE NEUTOROPHIL: 0.02 K/CU MM
TOTAL NUCLEATED RBC: 0 K/CU MM
TOTAL PROTEIN: 5.7 GM/DL (ref 6.4–8.2)
URINE TOTAL PROTEIN: 40.1 MG/DL
WBC # BLD: 6.6 K/CU MM (ref 4–10.5)

## 2023-03-25 PROCEDURE — 2580000003 HC RX 258: Performed by: STUDENT IN AN ORGANIZED HEALTH CARE EDUCATION/TRAINING PROGRAM

## 2023-03-25 PROCEDURE — 85025 COMPLETE CBC W/AUTO DIFF WBC: CPT

## 2023-03-25 PROCEDURE — 2580000003 HC RX 258: Performed by: INTERNAL MEDICINE

## 2023-03-25 PROCEDURE — 6370000000 HC RX 637 (ALT 250 FOR IP): Performed by: INTERNAL MEDICINE

## 2023-03-25 PROCEDURE — 80053 COMPREHEN METABOLIC PANEL: CPT

## 2023-03-25 PROCEDURE — 2500000003 HC RX 250 WO HCPCS: Performed by: STUDENT IN AN ORGANIZED HEALTH CARE EDUCATION/TRAINING PROGRAM

## 2023-03-25 PROCEDURE — 83735 ASSAY OF MAGNESIUM: CPT

## 2023-03-25 PROCEDURE — 94761 N-INVAS EAR/PLS OXIMETRY MLT: CPT

## 2023-03-25 PROCEDURE — 84100 ASSAY OF PHOSPHORUS: CPT

## 2023-03-25 PROCEDURE — 36415 COLL VENOUS BLD VENIPUNCTURE: CPT

## 2023-03-25 PROCEDURE — 82248 BILIRUBIN DIRECT: CPT

## 2023-03-25 RX ORDER — SODIUM CHLORIDE, SODIUM LACTATE, POTASSIUM CHLORIDE, CALCIUM CHLORIDE 600; 310; 30; 20 MG/100ML; MG/100ML; MG/100ML; MG/100ML
INJECTION, SOLUTION INTRAVENOUS CONTINUOUS
Status: DISCONTINUED | OUTPATIENT
Start: 2023-03-25 | End: 2023-03-25 | Stop reason: HOSPADM

## 2023-03-25 RX ORDER — POTASSIUM CHLORIDE 20 MEQ/1
40 TABLET, EXTENDED RELEASE ORAL 2 TIMES DAILY WITH MEALS
Status: DISCONTINUED | OUTPATIENT
Start: 2023-03-25 | End: 2023-03-25 | Stop reason: HOSPADM

## 2023-03-25 RX ADMIN — POTASSIUM CHLORIDE 40 MEQ: 1500 TABLET, EXTENDED RELEASE ORAL at 12:35

## 2023-03-25 RX ADMIN — SODIUM CHLORIDE, POTASSIUM CHLORIDE, SODIUM LACTATE AND CALCIUM CHLORIDE: 600; 310; 30; 20 INJECTION, SOLUTION INTRAVENOUS at 12:35

## 2023-03-25 RX ADMIN — SODIUM CHLORIDE, PRESERVATIVE FREE 10 ML: 5 INJECTION INTRAVENOUS at 08:17

## 2023-03-25 RX ADMIN — SODIUM BICARBONATE: 84 INJECTION, SOLUTION INTRAVENOUS at 08:29

## 2023-03-25 RX ADMIN — POTASSIUM CHLORIDE 40 MEQ: 1500 TABLET, EXTENDED RELEASE ORAL at 17:56

## 2023-03-25 NOTE — DISCHARGE SUMMARY
V2.0  Discharge Summary    Name:  Neyda Pabon /Age/Sex: 1955 (69 y.o. male)   Admit Date: 3/24/2023  Discharge Date: 3/25/23    MRN & CSN:  [id number] & [id number] Encounter Date and Time 3/25/23 7:43 PM EDT    Attending:  Alicia Keith MD Discharging Provider: Shawn Davila MD       Hospital Course:     Brief HPI: Neyda Pabon is a 69 y.o. male who presented with ***    Brief Problem Based Course:   ***    s      The patient expressed appropriate understanding of, and agreement with the discharge recommendations, medications, and plan. Consults this admission:  IP CONSULT TO NEPHROLOGY    Discharge Diagnosis:   Gastroenteritis    ***    Discharge Instruction:   Follow up appointments: ***  Primary care physician: Jovita Marques DO within 2 weeks  Diet: {diet:31683}   Activity: {discharge activity:01134}  Disposition: Discharged to:   []Home, []HHC, []SNF, []Acute Rehab, []Hospice ***  Condition on discharge: Stable  Labs and Tests to be Followed up as an outpatient by PCP or Specialist: ***    Discharge Medications:        Medication List        CONTINUE taking these medications      B-12 5000 MCG Caps     Co Q 10 100 MG Caps     Magnesium 300 MG Caps     MULTIVITAMIN ADULT PO     NONFORMULARY     PROBIOTIC DAILY PO             Objective Findings at Discharge:   BP (!) 144/86   Pulse 73   Temp 98.3 °F (36.8 °C) (Oral)   Resp 16   Ht 5' 9\" (1.753 m)   Wt 184 lb 8 oz (83.7 kg)   SpO2 97%   BMI 27.25 kg/m²       Physical Exam:   General: NAD  Eyes: EOMI  ENT: neck supple  Cardiovascular: Regular rate. Respiratory: Clear to auscultation  Gastrointestinal: Soft, non tender  Genitourinary: no suprapubic tenderness  Musculoskeletal: No edema  Skin: warm, dry  Neuro: Alert. Psych: Mood appropriate.          Labs and Imaging   CT ABDOMEN PELVIS WO CONTRAST Additional Contrast? None    Result Date: 3/24/2023  EXAMINATION: CT OF THE ABDOMEN AND PELVIS WITHOUT CONTRAST 3/24/2023 8:32 am level was 3.3 today-recommend rechecking it as an outpatient. Essential hypertension  -Apparently he is not taking any medication for this at this time. Blood pressure was 144/86 the day of discharge. Hyperlipidemia  -Apparently currently not on medication    Borderline diabetes-Per patient's report  -Not on medication right now. A1c was 6.2% in 2020. History of colon cancer  -He reports that he had colon surgery in 2000 at the [address] [place] [address]-27 inches of his colon were removed, and he reports that he did not need to have chemotherapy or radiation    Tobacco use-he reports that he very rarely smokes cigars    BMI is 27.2    Social history  -Surrogate decision maker: His spouse Tessy Giles is listed as the alternate contact in the chart      The patient expressed appropriate understanding of, and agreement with the discharge recommendations, medications, and plan. Consults this admission:  IP CONSULT TO NEPHROLOGY    Discharge Diagnosis:   Acute kidney injury    Discharge Instruction:   Follow up appointments: Nephrology  Primary care physician: Dawn Neil DO within 2 weeks  Diet: cardiac diet   Activity: activity as tolerated  Disposition: Discharged to:   [x]Home, [][facility], []SNF, []Acute Rehab, []Hospice   Condition on discharge: Stable  Labs and Tests to be Followed up as an outpatient by PCP or Specialist: Recheck alkaline phosphatase and creatinine    Discharge Medications:        Medication List        CONTINUE taking these medications      B-12 5000 MCG Caps     Co Q 10 100 MG Caps     Magnesium 300 MG Caps     MULTIVITAMIN ADULT PO     NONFORMULARY     PROBIOTIC DAILY PO             Objective Findings at Discharge:   BP (!) 144/86   Pulse 73   Temp 98.3 °F (36.8 °C) (Oral)   Resp 16   Ht 5' 9\" (1.753 m)   Wt 184 lb 8 oz (83.7 kg)   SpO2 97%   BMI 27.25 kg/m²       Physical Exam:   General: NAD  Eyes: EOMI  Neuro: Alert. Psych: Mood appropriate.          Labs and Imaging   CT ABDOMEN

## 2023-03-25 NOTE — PROGRESS NOTES
Nephrology Progress Note  3/25/2023 11:10 AM        Subjective:   Admit Date: 3/24/2023  PCP: Frances Leiva DO    Interval History: Patient seen in early morning, this is a late entry    Diet: Eating little bit    ROS: Still has diarrhea  Abdominal pain is better  Blood pressure acceptable, no fever    Data:     Current meds:    [Held by provider] metoprolol succinate  25 mg Oral Daily    sodium chloride flush  5-40 mL IntraVENous 2 times per day    heparin (porcine)  5,000 Units SubCUTAneous 3 times per day      sodium chloride      sodium bicarbonate infusion 75 mL/hr at 03/25/23 0829         No intake/output data recorded.     CBC:   Recent Labs     03/24/23  0807 03/25/23  0440   WBC 11.1* 6.6   HGB 17.9 15.0    218          Recent Labs     03/24/23  0807 03/24/23  1613 03/25/23  0440   * 127* 133*   K 3.9 3.6 3.3*   CL 99 101 104   CO2 12* 15* 19*   BUN 47* 45* 39*   CREATININE 3.2* 2.6* 2.2*   GLUCOSE 134* 119* 120*       Lab Results   Component Value Date    CALCIUM 8.0 (L) 03/25/2023    PHOS 3.0 03/25/2023       Objective:     Vitals: /77   Pulse 59   Temp 97.9 °F (36.6 °C) (Oral)   Resp 16   Ht 5' 9\" (1.753 m)   Wt 184 lb 8 oz (83.7 kg)   SpO2 100%   BMI 27.25 kg/m² ,    General appearance: Alert, awake and oriented  HEENT: At least 1+ conjunctival pallor, no scleral icterus  Neck: Supple  Lungs: No crackles on auscultation  Heart: Regular rate and rhythm  Abdomen: Soft nontender    Extremities: No edema      Problem List :         Impression :     Acute kidney injury on top of CKD stage G3 a- A2-his urine was 100% bland yesterday minimal albuminuria-increased BUN and creatinine likely from volume depletion-creatinine going down  Low sodium likely from hypovolemia-low potassium from GI loss and poor oral intake  Predominantly GI symptoms with history of hypertension and colon cancer-metabolic acidosis expected to improve with kidney recovery and stopping of GI loss-he has rhinovirus now everything makes sense    Recommendation/Plan  :     Replete potassium-and other electrolytes specially magnesium-oral hydration-if he cannot do that then intravenously-watch for iatrogenic nosocomial complication, follow clinically and biochemically-probably no need for bicarbonate drip-as his potassium and calcium likely will go down-if necessary lactated Ringer might be a good choice-even though it is slightly hypotonic and he has hyponatremia-overall might be the right solution for him at least for now      Shahrzad Scott MD MD

## 2023-07-13 ENCOUNTER — TELEPHONE (OUTPATIENT)
Dept: GASTROENTEROLOGY | Age: 68
End: 2023-07-13

## 2023-07-13 NOTE — TELEPHONE ENCOUNTER
Hansel Archibald pt had pcp call office for us to call patient. Patient requested Colonoscopy and EGD.     H&P completed  Sent to schedulers

## 2023-07-24 ENCOUNTER — TELEPHONE (OUTPATIENT)
Dept: GASTROENTEROLOGY | Age: 68
End: 2023-07-24

## 2023-07-24 NOTE — TELEPHONE ENCOUNTER
Per my call to [facility] at [facility]; no auth required for C-scope. EGD is pending auth. Case # [id number]. Clinicals attached.

## 2023-08-01 ENCOUNTER — TELEPHONE (OUTPATIENT)
Dept: GASTROENTEROLOGY | Age: 68
End: 2023-08-01

## 2024-12-03 NOTE — TELEPHONE ENCOUNTER
Est pt w/Cristela  called in to office to get colonoscopy/EGD. Found EGD in chart but last colonoscopy done elsewhere.  (Attached)  H&P completed  Sent to schedulers General


Chief Complaint:  Chest Pain


Stated Complaint:  CP, EPIGASTRIC PAIN


Time Seen by MD:  13:38





History of Present Illness


Initial Comments


54-year-old male presents to the ED for evaluation of chest pain onset this 

morning.  Patient reports shortness a breath and epigastric discomfort, but 

denies any other associated symptoms at this time.  Patient mentioned he had a 

heart attack 1 month ago and had 2 stents placed.  Patient mentioned he began 

with shortness a breath 4 days after stents were placed but that today symptoms 

worsened.  Patient mentioned he has history of states apnea and is on CPAP at 

home.


Allergies:  


Coded Allergies:  


     No Known Allergies (Unverified  Allergy, Unknown, 10/27/24)


Home Meds


Active Scripts


Metoprolol Succinate (Toprol Xl) 25 Mg Tab.er.24h, 25 MG PO DAILY, #60 TAB


   Prov:SIDNEY JEAN [facility]         11/2/24


Ticagrelor (Brilinta) 90 Mg Tablet, 90 MG PO BID, #60 TAB


   Prov:SIDNEY JEAN [facility]         11/2/24


Ferrous Sulfate (Ferrous Sulfate) 324 Mg (65 Mg Iron) Tablet.dr, 325 MG PO 

DAILY, #60 TAB


   Prov:SIDNEY JEAN [facility]         11/2/24


Aspirin (ASPIRIN 81 MG ECTAB) 81 Mg Ectab, 81 MG PO DAILY, #60 TAB.EC


   Prov:SIDNEY JEAN [facility]         11/2/24


Reported Medications


Atorvastatin Calcium (Atorvastatin Calcium) 80 Mg Tablet, 1 TAB PO DAILY for 30 

Days, #30 TAB 0 Refills


   10/28/24


Metformin HCl (Metformin HCl) 500 Mg Tablet, 1 TAB PO BID for 30 Days, #60 TAB 0

Refills


   10/28/24


Gabapentin (Neurontin) 300 Mg Capsule, 2 CAP PO HS for 30 Days, #90 CAP 0 

Refills


   10/28/24


Omeprazole (Omeprazole) 40 Mg Capsule.dr, 1 CAP PO BIDLUNCHDINNER for 30 Days, 

#30 CAP 0 Refills


   10/28/24


Lisinopril (Lisinopril) 2.5 Mg Tablet, 1 TAB PO DAILY for 30 Days, #30 TAB 0 

Refills


   10/28/24


Dapagliflozin/Metformin HCl (Xigduo Xr 10 mg-1,000 mg Tab) 10 Mg-1,000 Mg 

Tab.bp.24h, 1 TAB PO DAILY for diabetes for 30 Days, #30 TAB 0 Refills


   10/28/24





Past Medical History


Past Medical History:  Diabetes-Type II, High Cholesterol, Heart Disease, 

Hypertension


Past Surgical History:  Other


Surgical History Other:  STENTS X 3





ROS Dictation


Constitutional: Negative for fever,chills, and weight loss


Eyes: Negative for injury, pain,redness, and discharge


ENT: Negative for injury,pain or swelling


Cardiovascular:  Positive for chest pain negative for palpitations, and edema


Respiratory:  Positive for shortness a breath negative for cough, and wheezing, 


Abdomen/GI: Negative for abdominal pain, nausea, vomiting, diarrhea, and 

constipation


Back: Negative for injury and pain


: Negative for injury, bleeding and discharge


MS/Extremity: Negative for injury and deformity


Skin: Negative for rash, and discoloration


Neuro: Negative for headache, weakness, numbness, tingling, and seizure 


Psych: Negative for suicide ideation, homicidal ideation, and hallucinations





Physical Exam


Physical Exam Dictation


General: awake, alert, NAD


Head/Face: Normocephalic, atraumatic


Eyes: PERRL, EOMI, vision at baseline


ENT: oral cavity clear, TMs clear, no signs of infection


Neck: Trachea midline, supple, no nuchal rigidity


Cardiovascular: RRR, normal S1/S2, No MRGs, no JVD


Respiratory: CTAB, no respiratory distress, No rales or wheezes


Abdomen: Soft, mild epigastric tenderness, non-distended, normal bowel sounds, 

no guarding or rebound.


Skin: Warm, dry, normal turgor, no rash


MS/Extremity: Pulses equal, no cyanosis, neurovascular intact, FROM


Neuro: COAx4, GCS 15, strength 5/5, CN 2-12 intact, normal cerebellar exam, 

normal gait,


Psych: Normal behavior, mood, and affect normal





Results


Laboratory and Microbiology


Lab and Micro Result





Laboratory Tests








Test


 12/3/24


14:03 12/3/24


16:30


 


White Blood Count


 5.9 K/uL


(4.8-10.8) 





 


Red Blood Count


 4.77 MIL/uL


(4.50-6.20) 





 


Hemoglobin


 13.2 g/dL


(14.0-18.0)  L 





 


Hematocrit


 40.7 % (42-54)


L 





 


Mean Corpuscular Volume


 85.3 fL


(79-99) 





 


Mean Corpuscular Hemoglobin


 27.7 pg


(27.0-33.0) 





 


Mean Corpuscular Hemoglobin


Concent 32.4 g/dL


(32.0-36.0) 





 


Red Cell Distribution Width


 14.8 %


(11.0-15.5) 





 


Platelet Count


 191 K/uL


(130-400) 





 


Mean Platelet Volume


 11.3 fL


(7.5-10.5)  H 





 


Immature Granulocyte % (Auto) 0.8 % (0-1)   


 


Neutrophils (%) (Auto)


 71.7 %


(40.0-77.0) 





 


Lymphocytes (%) (Auto)


 14.3 %


(21.0-51.0)  L 





 


Monocytes (%) (Auto)


 6.6 %


(3.0-13.0) 





 


Eosinophils (%) (Auto)


 5.9 %


(0.0-8.0) 





 


Basophils (%) (Auto)


 0.7 %


(0.0-5.0) 





 


Neutrophils # (Auto)


 4.3 K/uL


(1.8-7.7) 





 


Lymphocytes # (Auto)


 0.9 K/uL


(1.0-4.8)  L 





 


Monocytes # (Auto)


 0.4 K/uL


(0.1-1.0) 





 


Eosinophils # (Auto)


 0.35 K/uL


(0.00-0.70) 





 


Basophils # (Auto)


 0.04 K/uL


(0.00-0.20) 





 


Absolute Immature Granulocyte


(auto 0.05 K/uL


(0-1) 





 


Nucleated Red Blood Cells


 0.0 %


(0.0-0.19) 





 


Sodium Level


 139 mmol/L


(136-145) 





 


Potassium Level


 4.6 mmol/L


(3.5-5.1) 





 


Chloride Level


 105 mmol/L


(101-111) 





 


Carbon Dioxide Level


 26 mmol/L


(21-32) 





 


Blood Urea Nitrogen


 13 mg/dL


(7-18) 





 


Creatinine


 1.1 mg/dL


(0.5-1.3) 





 


Glomerular Filtration Rate


Calc 79 mL/min


(>90) 





 


Random Glucose


 257 mg/dL


()  H 





 


Total Calcium


 8.8 mg/dL


(8.5-10.1) 





 


Total Creatine Kinase


 160 U/L


() 





 


Troponin I High Sensitivity 8 ng/L (4-75)   6 ng/L (4-75)  


 


B-Type Natriuretic Peptide


 12 pg/mL


(0-100) 











Labs Reviewed?:  Yes





EKG/XRAY/US/CT/MRI


EKG Comment


EKG 12/03/2024 time 1:31 p.m. ventricular rate 84, sinus rhythm, left anterior 

fascicular block, .  QRS D 93, .  No STEMI





MDM


MDM: 


Differential diagnosis:  GERD, gastritis, shortness a breath


Previous outside records reviewed: Old ER visits.


Need for hospitalization: Patient does not meet criteria for hospitalization.


Need for emergency major/minor surgery: No





Patient's prior external medical records from other ER visits were reviewed by 

me as indicated.  Prior testing and results from previous visits were reviewed. 

Prior tests were taken into account with medical decision making and resource 

utilization, independent historian/historians were used to obtain complete 

medical history.





I independently interpreted the test that were performed, results were reviewed 

by me and considered findings on radiology if ordered.





Medical management and examination interpretation discussions were had by me wit

h other qualified healthcare professionals as indicated for the patient's care.


ED Course





Orders








Procedure Category Date Status





  Time 


 


Vital Signs Per CPOE 12/3/24 Transmitted





Routine  13:40 


 


B-Type Natriuretic LAB 12/3/24 Complete





Peptide  13:40 


 


Chest 1vw RAD 12/3/24 Resulted





  13:40 


 


12 Lead Ekg Tracing- EKG 12/3/24 Complete





Technical  13:40 


 


Oxygen By Nc/Pulse Ox CPOE 12/3/24 Transmitted





  13:40 


 


Maintain Iv CPOE 12/3/24 Transmitted





  13:40 


 


Iv Insertion CPOE 12/3/24 Transmitted





  13:40 


 


Cardiac Monitoring CPOE 12/3/24 Transmitted





  13:40 


 


Pulse Oximetry With CPOE 12/3/24 Transmitted





Vs And Prn  13:40 


 


Cbc With Differential LAB 12/3/24 Complete





  13:40 


 


Activity: Br W/Brp CPOE 12/3/24 Transmitted





With Assist  13:40 


 


Creatine Kinase, Total LAB 12/3/24 Complete





  13:40 


 


Urinalysis Profile LAB 12/3/24 Logged





  13:40 


 


Bedside Troponin-I LAB.ER 12/3/24 In Process





 (Poc)  13:40 


 


Basic Metabolic Panel LAB 12/3/24 Complete





  13:40 


 


Troponin I High LAB 12/3/24 Complete





Sensitivity  13:45 


 


Troponin I High LAB 12/3/24 Complete





Sensitivity  15:56 


 


Lidocaine Hcl 2% PHA 12/3/24 In Process





Viscous (Lidocaine Hcl  18:30 


 


Mag/Alum/Simeth 30ml PHA 12/3/24 In Process





 (Maalox Plus 30ml)  18:30 


 


Pantoprazole 40mg Inj PHA 12/3/24 In Process





 (Protonix 40mg Inj  18:30 








Current Medications








 Medications


  (Trade)  Dose


 Ordered  Sig/Yamile


 Route


 PRN Reason  Start Time


 Stop Time Status Last Admin


Dose Admin


 


 Al Hydroxide/Mg


 Hydroxide


  (MAALox PLUS


 30ML)  30 ml  ONCE  ONCE


 PO


   12/3/24 18:30


 12/3/24 18:31  12/3/24 18:21





 


 Lidocaine HCl


  (Lidocaine HCl


 2% Viscous)  10 ml  ONCE  ONCE


 PO


   12/3/24 18:30


 12/3/24 18:31  12/3/24 18:21





 


 Pantoprazole


 Sodium


  (PROTonix 40MG


 INJ)  40 mg  ONCE  ONCE


 IVP


   12/3/24 18:30


 12/3/24 18:31   











Vital Signs








  Date Time  Temp Pulse Resp B/P (MAP) Pulse Ox O2 Delivery O2 Flow Rate FiO2


 


12/3/24 13:38 97.5 81 16 121/73 97 Room Air 0 

















HEART Score Response (Comments) Value


 


History: Moderate suspicion (+1) 1


 


EKG: Normal 0


 


Age:                                    45-65yrs (+1) 1


 


Risk Factors:                           1-2 risk factors (+1) 1


 


Initial Troponin: Normal limit (0) 0


 


HEART Score Risk: Low Risk for MACE (1-3) 


 


Total  3











DX & DISP


Disposition:  Discharge


Departure


Impression:  


   Primary Impression:  GERD (gastroesophageal reflux disease)


   Additional Impression:  Gastritis


Condition:  Stable


Scripts


Pantoprazole Sodium (Protonix) 40 Mg Ectab


1 TAB PO DAILY for 30 Days, #30 TAB 0 Refills


   Prov: ITALO SIDDIQI MD         12/3/24





Additional Instructions:  


You have been reviewed in the emergency department at [facility] 

after presenting with chest pain.  After considering your history, your risk 

factors, your EKG and your blood test troponins, have been found to be at very 

low risk less than (1 in 100) of having a major adverse cardiac event (like 

heart attack) in the near future.  In the " low risk" group, the risks of doing 

further tests and treatment as the inpatient outweighs the benefits.  In many 

patients in the low risk group for the test of any sort or unnecessary, however 

he should discuss this further with his general practitioner who will understand

the medical and personal backgrounds better.  Because we have never declared 

you" no risk" we would suggest.  


1 returning for medical review if you have further episodes of chest pain/arm 

pain or other concerning symptoms like dizziness, collapse, palpitations or 

shortness of breath.


2.  Following up with your local doctor who will consider the need for further 

testing and will also ensure that any modifiable risk factors you may have for 

heart disease are optimally managed.





Patient will be discharged in stable condition at the moment discharge patient 

states , no chest pain


Referrals:  


DESTINY MARY MD (PCP)


Time of Disposition:  18:26


I have reviewed, & agreed with my scribe's, documentation. (Entered by Candace Jimenez, acting as a scribe for Dr. Siddiqi)








I personally scribed for ITALO SIDDIQI MD (FOUZIA) on 12/3/24 at 13:48.  

Electronically submitted by Candace Jimenez ([facility]).


I personally scribed for ITALO SIDDIQI MD (FOUZIA) on 12/3/24 at 16:45.  

Electronically submitted by Candace Jimenez ([email or web address]).





ITALO SIDDIQI MD                 Dec 3, 2024 13:48

## (undated) DEVICE — TUBING, SUCTION, 3/16" X 6', STRAIGHT: Brand: MEDLINE

## (undated) DEVICE — BRUSH CLN DIA5MM NYL SGL END CBL ASST FLEX DSTL TIP POLYPR

## (undated) DEVICE — LINER SUCT CANSTR 1500CC SEMI RIG W/ POR HYDROPHOBIC SHUT

## (undated) DEVICE — JELLY LUBRICATING 3 GM BACTERIOSTATIC

## (undated) DEVICE — YANKAUER,FLEXIBLE HANDLE,REGLR CAPACITY: Brand: MEDLINE INDUSTRIES, INC.

## (undated) DEVICE — TUBING, SUCTION, 9/32" X 10', STRAIGHT: Brand: MEDLINE

## (undated) DEVICE — Z DISCONTINUED (USE MFG CAT MVABO)  TUBING GAS SAMPLING STD 6.5 FT FEMALE CONN SMRT CAPNOLINE